# Patient Record
Sex: FEMALE | Race: WHITE | NOT HISPANIC OR LATINO | Employment: FULL TIME | URBAN - METROPOLITAN AREA
[De-identification: names, ages, dates, MRNs, and addresses within clinical notes are randomized per-mention and may not be internally consistent; named-entity substitution may affect disease eponyms.]

---

## 2024-07-10 PROCEDURE — 87660 TRICHOMONAS VAGIN DIR PROBE: CPT | Performed by: PHYSICIAN ASSISTANT

## 2024-07-10 PROCEDURE — 87480 CANDIDA DNA DIR PROBE: CPT | Performed by: PHYSICIAN ASSISTANT

## 2024-07-10 PROCEDURE — 87510 GARDNER VAG DNA DIR PROBE: CPT | Performed by: PHYSICIAN ASSISTANT

## 2024-07-16 ENCOUNTER — ROUTINE PRENATAL (OUTPATIENT)
Dept: OBGYN CLINIC | Facility: CLINIC | Age: 29
End: 2024-07-16

## 2024-07-16 VITALS — SYSTOLIC BLOOD PRESSURE: 114 MMHG | DIASTOLIC BLOOD PRESSURE: 62 MMHG | BODY MASS INDEX: 21.83 KG/M2 | WEIGHT: 147.8 LBS

## 2024-07-16 DIAGNOSIS — Z3A.18 18 WEEKS GESTATION OF PREGNANCY: ICD-10-CM

## 2024-07-16 DIAGNOSIS — Z34.02 ENCOUNTER FOR SUPERVISION OF NORMAL FIRST PREGNANCY, SECOND TRIMESTER: Primary | ICD-10-CM

## 2024-07-16 PROCEDURE — PNV

## 2024-07-16 NOTE — PROGRESS NOTES
Patient is a 27 YO  female presenting to the office at 18w1d for routine OB care.   Patient is feeling well today.   Recently dx with BV and Yeast infection, still taking Monistat and Flagyl, she has been feeling better in regards to vaginal sx. Patient does still c/o episodes of near syncope. She has been finding ways to manage this like adding a stool to her kitchen to sit while she cooks. She notes that she has stopped grocery shopping alone because stores seem to be a trigger for her symptoms.  US with M 24  Fetal heart rate: 135  BP: 114/62  TWG: 15lb 12.8oz  Fetal Movement: not feeling yet  LOF: no  VB: no  CTX: no  Reviewed precautions  Call for concerns  RTO 4 weeks

## 2024-07-30 ENCOUNTER — ROUTINE PRENATAL (OUTPATIENT)
Facility: HOSPITAL | Age: 29
End: 2024-07-30
Payer: COMMERCIAL

## 2024-07-30 VITALS
SYSTOLIC BLOOD PRESSURE: 104 MMHG | OXYGEN SATURATION: 99 % | HEART RATE: 94 BPM | HEIGHT: 69 IN | DIASTOLIC BLOOD PRESSURE: 54 MMHG | BODY MASS INDEX: 22.48 KG/M2 | WEIGHT: 151.8 LBS

## 2024-07-30 DIAGNOSIS — Z36.86 ENCOUNTER FOR ANTENATAL SCREENING FOR CERVICAL LENGTH: ICD-10-CM

## 2024-07-30 DIAGNOSIS — Z36.89 ENCOUNTER FOR FETAL ANATOMIC SURVEY: ICD-10-CM

## 2024-07-30 DIAGNOSIS — Z3A.20 20 WEEKS GESTATION OF PREGNANCY: Primary | ICD-10-CM

## 2024-07-30 PROBLEM — Z36.82 NUCHAL TRANSLUCENCY OF FETUS ON PRENATAL ULTRASOUND: Status: RESOLVED | Noted: 2024-06-05 | Resolved: 2024-07-30

## 2024-07-30 PROBLEM — Z13.79 GENETIC SCREENING: Status: RESOLVED | Noted: 2024-06-05 | Resolved: 2024-07-30

## 2024-07-30 PROCEDURE — 76817 TRANSVAGINAL US OBSTETRIC: CPT | Performed by: OBSTETRICS & GYNECOLOGY

## 2024-07-30 PROCEDURE — 99213 OFFICE O/P EST LOW 20 MIN: CPT | Performed by: OBSTETRICS & GYNECOLOGY

## 2024-07-30 PROCEDURE — 76805 OB US >/= 14 WKS SNGL FETUS: CPT | Performed by: OBSTETRICS & GYNECOLOGY

## 2024-07-30 NOTE — PROGRESS NOTES
"[facility]: Po Nieves was seen today at 20w1d for anatomic survey and cervical length screening ultrasound.  See ultrasound report under \"OB Procedures\" tab.  Please don't hesitate to contact our office with any concerns or questions.  -Paige Packer MD      "

## 2024-07-30 NOTE — PROGRESS NOTES
Ultrasound Probe Disinfection    A transvaginal ultrasound was performed.   Prior to use, disinfection was performed with High Level Disinfection Process (Pharmapodon).  Probe serial number [id number] [facility]: [id number] was used.      Claudette Anderson  07/30/24  8:01 AM

## 2024-07-30 NOTE — LETTER
"2024     Winsome Jain MD   [facility] [address]    Patient: Po Nieves   YOB: 1995   Date of Visit: 2024       Dear Dr. Jain:    Thank you for referring Po Nieves to me for evaluation. Below are my notes for this consultation.    If you have questions, please do not hesitate to call me. I look forward to following your patient along with you.         Sincerely,        Paige Packer MD        CC: No Recipients    Paige Packer MD  2024  9:40 AM  Sign when Signing Visit  [facility]: Po Nieves was seen today at 20w1d for anatomic survey and cervical length screening ultrasound.  See ultrasound report under \"OB Procedures\" tab.  Please don't hesitate to contact our office with any concerns or questions.  -Paige Packer MD         "

## 2024-08-15 ENCOUNTER — ROUTINE PRENATAL (OUTPATIENT)
Dept: OBGYN CLINIC | Facility: CLINIC | Age: 29
End: 2024-08-15

## 2024-08-15 VITALS — DIASTOLIC BLOOD PRESSURE: 60 MMHG | BODY MASS INDEX: 23.04 KG/M2 | SYSTOLIC BLOOD PRESSURE: 120 MMHG | WEIGHT: 156 LBS

## 2024-08-15 DIAGNOSIS — Z3A.22 22 WEEKS GESTATION OF PREGNANCY: ICD-10-CM

## 2024-08-15 DIAGNOSIS — Z34.82 ENCOUNTER FOR SUPERVISION OF OTHER NORMAL PREGNANCY, SECOND TRIMESTER: Primary | ICD-10-CM

## 2024-08-15 PROCEDURE — PNV: Performed by: OBSTETRICS & GYNECOLOGY

## 2024-08-15 NOTE — PROGRESS NOTES
28 y.o.   female at 22.3 wga for PNV. BP : 120/60. TW  + FM, - LOF, - Ctxn, - bleeding  Feeling well.  No complaints  Growth at 32 weeks  Reviewed PTL precautions  F/u 4 weeks

## 2024-09-10 ENCOUNTER — APPOINTMENT (OUTPATIENT)
Dept: LAB | Facility: AMBULARY SURGERY CENTER | Age: 29
End: 2024-09-10

## 2024-09-16 ENCOUNTER — ROUTINE PRENATAL (OUTPATIENT)
Dept: OBGYN CLINIC | Facility: CLINIC | Age: 29
End: 2024-09-16

## 2024-09-16 VITALS — SYSTOLIC BLOOD PRESSURE: 114 MMHG | BODY MASS INDEX: 23.83 KG/M2 | WEIGHT: 161.4 LBS | DIASTOLIC BLOOD PRESSURE: 60 MMHG

## 2024-09-16 DIAGNOSIS — Z3A.27 27 WEEKS GESTATION OF PREGNANCY: ICD-10-CM

## 2024-09-16 DIAGNOSIS — Z34.82 ENCOUNTER FOR SUPERVISION OF OTHER NORMAL PREGNANCY, SECOND TRIMESTER: Primary | ICD-10-CM

## 2024-09-16 PROCEDURE — PNV: Performed by: OBSTETRICS & GYNECOLOGY

## 2024-09-16 NOTE — PROGRESS NOTES
29 y.o.  female at 27w0d (Estimated Date of Delivery: 24) for PNV.    Pre- Vitals      Flowsheet Row Most Recent Value   Prenatal Assessment    Fetal Heart Rate 135   Fundal Height (cm) 27 cm   Movement Present   Prenatal Vitals    Blood Pressure 114/60   Weight - Scale 73.2 kg (161 lb 6.4 oz)   Urine Albumin/Glucose    Dilation/Effacement/Station    Vaginal Drainage    Edema           TW.3 kg (29 lb 6.4 oz)    Leakage of fluid: no  Vaginal bleeding: no  Contractions/Cramping: no  Fetal movement: yes    Unsure of contraception plan.   Planning to get a flu shot.   28w labs ordered   Pt verbalized concerned about her [facility] labs- elevated cholesterol.   Discussed healthy diet (which she is doing now) and exercise. Waiting to hear back from PCP. A1c WNL.   Normotensive today   Went over labor precautions and fetal kick counts     RTO in 2 weeks.

## 2024-09-16 NOTE — PROGRESS NOTES
Patient states she is feeling well, no complaints. 28w labs ordered.     Patient unable to leave urine sample.

## 2024-09-16 NOTE — PATIENT INSTRUCTIONS
The Third Trimester  (28-42 weeks)  YOUR BABY   * your baby sucks its thumb now!   * your baby can hear voices and respond to touch…..so talk to him or her!!   * your baby’s brain grows and develops most in the last 2 months of pregnancy   * baby’s head and bones are soft and flexible so they can fit through the birth canal   * baby’s movements change towards the end of pregnancy because there is less room for kicking and stretching in your belly   * baby’s lungs are not fully developed and completely ready to breathe on their own until the last 3-4 weeks before your due date    YOUR BODY   * your belly is growing a lot now   * it may become more difficult to sleep well at night or to be as active as you usually are   * you may sweat more than usual   * you will become more off-balance……be careful not to fall!!   * you may develop hemorrhoids (which can be painful and make it difficult to sit down)   * the last two months of pregnancy can become very uncomfortable……with backaches, headaches, and heartburn   * you can start to have contractions…….as long as they are irregular and less than 5 per hour, this is a normal part of your body getting ready to have a baby   * your cervix may start to thin out and open up……to get ready for delivery   * you may find yourself needing to “pee” very often…….because baby is pressing on your bladder so much   * you may get out of breathe more quickly than usual      FETAL KICK COUNTS    In the third trimester (after 28 weeks gestation) you should be performing fetal kick counts every day.  Your baby should move at least 10 times in 2 hours during an active time, once a day.    Choose atime of day when your baby is most active.  Try to do this around the same time each day.  Get into a comfortable position and then write down the time your baby first moves.  Count each movement until the baby moves 10 times.  These movements include kicks, punches, nudges, flutters, or rolls.  This  can take anywhere from 5 minutes to 2 hours.  Write down the time you feel the baby's 10th movement.    If 2 hours has passed and your baby has not moved at least 10 times, you should CALL THE OFFICE RIGHT AWAY.  [phone]          PREMATURE LABOR     When to call [phone]:  * I need to call immediately if I have even a small amount of LIQUID leaking from my vagina, with or without contractions.   * I need to call if I am BLEEDING from my vagina.   * I need to call if I am feeling CRAMPING that continues after drinking 2-3 glasses of water and lying down on my side for one hour and that feels like I am having a period.   * I need to call if I feel CONTRACTIONS  more than 4 times in an hour that feels like the baby is “balling up” even after I try drinking 2-3 glasses of water and lying down on my side for an hour.   * I need to call if I notice a change in my vaginal DISCHARGE.   * I need to call if I am feeling PELVIC PRESSURE  that feels like the baby is pushing down into my vagina and lasts more than an hour.   * I need to call if I have LOW BACKACHE which is new and near my tailbone.  It may either come and go several times during an hour or stay there constantly.          PRE-ECLAMPSIA     What is it?   Pre-eclampsia is a serious disease that can occur during pregnancy related to high blood pressures.  It can happen to any woman.     Why should I care?   Women who develop pre-eclampsia have serious risks which can include seizures, stroke, organ damage, premature birth of their baby.  In the very worst cases, it can cause death of the mother and/or their baby.     What should I pay attention to?   Signs and symptoms of pre-eclampsia can include:   * Severe swelling of face or hands    * A headache that will not go away even after you have taken Tylenol   * Seeing spots or changes in eyesight    * Pain in the upper abdomen or shoulder    * New nausea and vomiting (in the second half of pregnancy)    *  Sudden weight gain    * Difficulty breathing     What should I do?   If you experience any of the above symptoms of pre-eclampsia, contact your OB provider.  Finding pre-eclampsia early is important for you and your baby.  Call us at [phone]      BREASTFEEDING     BENEFITS FOR BABIES   * stronger immune systems (less allergies, eczema, asthma, and childhood cancers)   * less diarrhea and constipation or other GI diseases   * fewer colds and ear infections   * better vision and teeth (fewer cavities)   * improves IQ   * lower rates of diabetes and obesity in childhood     BENEFITS FOR MOMS   * promotes faster weight loss after delivery   * lower risk for postpartum depression   * lower risk for breast, uterine, and ovarian cancers   * lower risk for osteoporosis developing with age   * easier than formula - is always right with you, clean, and the right temperature   * less expensive than formula……it’s FREE !!!!     KEYS TO SUCCESSFUL BREASTFEEDING   * keep baby skin-to-skin until after first feeding event   * keep baby in your room with you during your hospital stay after delivery   * avoid any bottle feedings (unless medically necessary)   * limit the use of pacifiers and swaddling   * ask for help if you are having any issues……lactation consultants (who specialize in breastfeeding) are available to help you   * a healthy diet for mom……eating a variety of foods and portions in moderation    THINGS YOU SHOULD KNOW ABOUT BREASTFEEDING   * most medications are considered compatible with breastfeeding by the American Academy of Pediatrics, but you should check with your health care provider or lactation consultant prior to taking a new medication……just to be sure it is safe   * alcohol (beer, wine, liquor) can be passed from mother to baby through breast milk……an occasional, social drink is deemed acceptable by the American Academy of Pediatrics…..more than that should be avoided   * breastfeeding is NOT an  effective method of birth control   * nicotine (in cigarettes) can pass from mother to baby through breast milk…..however, for mothers who smoke, it is still healthier to breastfeed than use formula   * caffeine should be limited to 1-3 cups per day……includes coffee, soda, energy drinks         PERINEAL / VAGINAL MASSAGE    What can I do now to decrease my chances of tearing during delivery?  Massaging around the vaginal opening by you (or your partner), either antepartum (before birth) or during the second stage of labor, can reduce the likelihood of perineal tearing during childbirth.  Likewise, the use of warm packs held on the perineum during the pushing stage of labor can reduce the severity of your tear.  This will happen during the pushing stage of labor.  At home, you can also help reduce the chances of injury that may occur during the birth of your child through perineal massage.    When should I do this?  Starting around or shortly after 34 weeks of pregnancy, you or your partner should provide 5-10 minutes of vaginal massage 1-4 times per week.    How?  Use either almond, coconut, or olive oil and water mixture on 1 or 2 fingers (depending on comfort).  Insert finger(s) 3-5cm into the vagina.  Apply sweeping downward/sideward pressure from 3 to 9 o'clock for 5-10 minutes, 1-4 times per week.          WARNING SIGNS DURING PREGNANCY  Call our office at [phone] if you experience any of the followin. Vaginal bleeding  2. Sharp abdominal pain that does not go away  3. Fever (more than 100.4 and is not relieved by Tylenol)  4. Persistent vomiting lasting greater than 24 hours  5. Chest pain   6. Pain or burning when you urinate  7. Severe headache that doesn't resolve with Tylenol  8. Blurred vision or seeing spots in your vision  9. Sudden swelling of your face or hands  10. Redness, swelling or pain in a leg  11. A sudden weight gain in just a few days  12. Decrease in your baby's movement (after  28 weeks or the 6th month of pregnancy)  13. A loss of watery fluid from your vagina - can be a gush, a trickle or continuous wetness  14. After 20 weeks of pregnancy, rhythmic cramping (greater than 4 per hour) or menstrual like low/pelvic pain          VACCINES IN PREGNANCY    TDAP  Whooping cough (or pertussis) can be serious for anyone, but for your , it can be life-threatning.  Up to 20 babies die each year in the [place] Due to whooping cough.  About half of babies younger than 1 year old who get whooping cough need treatment in the hospital.  The younger the baby is when he or she gets whooping cough, the more likely he or she will need to be treated in a hospital.  When you receive the whooping cough vaccine (Tdap) during your pregnancy, your body will create protective antibodies and pass some of them to your baby before birth.  These antibodies can help protect your baby from getting whooping cough until they are old enough to be vaccinated themselves (usually around 6 months of age).    INFLUENZA  Changes in your immune, heart, and lung functions during pregnancy make you more likely to get seriously ill from the flu.  Catching the flu also increases your chances for serious problems for your developing baby, including premature labor and delivery.  It is recommended that all women who are pregnant during flu season should receive an influenza vaccine.

## 2024-09-18 ENCOUNTER — APPOINTMENT (OUTPATIENT)
Dept: LAB | Facility: AMBULARY SURGERY CENTER | Age: 29
End: 2024-09-18
Attending: OBSTETRICS & GYNECOLOGY
Payer: COMMERCIAL

## 2024-09-18 DIAGNOSIS — Z34.82 ENCOUNTER FOR SUPERVISION OF OTHER NORMAL PREGNANCY, SECOND TRIMESTER: ICD-10-CM

## 2024-09-18 DIAGNOSIS — Z3A.27 27 WEEKS GESTATION OF PREGNANCY: ICD-10-CM

## 2024-09-18 LAB
BASOPHILS # BLD AUTO: 0.04 THOUSANDS/ΜL (ref 0–0.1)
BASOPHILS NFR BLD AUTO: 0 % (ref 0–1)
EOSINOPHIL # BLD AUTO: 0.06 THOUSAND/ΜL (ref 0–0.61)
EOSINOPHIL NFR BLD AUTO: 1 % (ref 0–6)
ERYTHROCYTE [DISTWIDTH] IN BLOOD BY AUTOMATED COUNT: 13.2 % (ref 11.6–15.1)
GLUCOSE 1H P 50 G GLC PO SERPL-MCNC: 82 MG/DL (ref 70–134)
HCT VFR BLD AUTO: 34.5 % (ref 34.8–46.1)
HGB BLD-MCNC: 11.1 G/DL (ref 11.5–15.4)
IMM GRANULOCYTES # BLD AUTO: 0.17 THOUSAND/UL (ref 0–0.2)
IMM GRANULOCYTES NFR BLD AUTO: 2 % (ref 0–2)
LYMPHOCYTES # BLD AUTO: 1.27 THOUSANDS/ΜL (ref 0.6–4.47)
LYMPHOCYTES NFR BLD AUTO: 13 % (ref 14–44)
MCH RBC QN AUTO: 30 PG (ref 26.8–34.3)
MCHC RBC AUTO-ENTMCNC: 32.2 G/DL (ref 31.4–37.4)
MCV RBC AUTO: 93 FL (ref 82–98)
MONOCYTES # BLD AUTO: 0.73 THOUSAND/ΜL (ref 0.17–1.22)
MONOCYTES NFR BLD AUTO: 7 % (ref 4–12)
NEUTROPHILS # BLD AUTO: 7.8 THOUSANDS/ΜL (ref 1.85–7.62)
NEUTS SEG NFR BLD AUTO: 77 % (ref 43–75)
NRBC BLD AUTO-RTO: 0 /100 WBCS
PLATELET # BLD AUTO: 239 THOUSANDS/UL (ref 149–390)
PMV BLD AUTO: 10 FL (ref 8.9–12.7)
RBC # BLD AUTO: 3.7 MILLION/UL (ref 3.81–5.12)
TREPONEMA PALLIDUM IGG+IGM AB [PRESENCE] IN SERUM OR PLASMA BY IMMUNOASSAY: NORMAL
WBC # BLD AUTO: 10.07 THOUSAND/UL (ref 4.31–10.16)

## 2024-09-18 PROCEDURE — 86780 TREPONEMA PALLIDUM: CPT

## 2024-09-18 PROCEDURE — 85025 COMPLETE CBC W/AUTO DIFF WBC: CPT

## 2024-09-18 PROCEDURE — 36415 COLL VENOUS BLD VENIPUNCTURE: CPT

## 2024-09-18 PROCEDURE — 82950 GLUCOSE TEST: CPT

## 2024-09-23 ENCOUNTER — CLINICAL SUPPORT (OUTPATIENT)
Dept: POSTPARTUM | Facility: CLINIC | Age: 29
End: 2024-09-23

## 2024-09-23 DIAGNOSIS — Z32.2 ENCOUNTER FOR CHILDBIRTH INSTRUCTION: Primary | ICD-10-CM

## 2024-10-03 ENCOUNTER — ROUTINE PRENATAL (OUTPATIENT)
Dept: OBGYN CLINIC | Facility: CLINIC | Age: 29
End: 2024-10-03

## 2024-10-03 VITALS — SYSTOLIC BLOOD PRESSURE: 120 MMHG | BODY MASS INDEX: 24.96 KG/M2 | DIASTOLIC BLOOD PRESSURE: 70 MMHG | WEIGHT: 169 LBS

## 2024-10-03 DIAGNOSIS — Z34.82 ENCOUNTER FOR SUPERVISION OF OTHER NORMAL PREGNANCY, SECOND TRIMESTER: ICD-10-CM

## 2024-10-03 DIAGNOSIS — Z3A.29 29 WEEKS GESTATION OF PREGNANCY: Primary | ICD-10-CM

## 2024-10-03 PROCEDURE — PNV: Performed by: OBSTETRICS & GYNECOLOGY

## 2024-10-03 NOTE — PROGRESS NOTES
29 y.o.  female at 29w3d (Estimated Date of Delivery: 24) for PNV.    Pre- Vitals      Flowsheet Row Most Recent Value   Prenatal Assessment    Fetal Heart Rate 140   Movement Present   Prenatal Vitals    Blood Pressure 120/70   Weight - Scale 76.7 kg (169 lb)   Urine Albumin/Glucose    Dilation/Effacement/Station    Vaginal Drainage    Edema           TW.8 kg (37 lb)    28 wk labs reviewed.   Red folder given and reviewed. Discussed Fetal kick counts, PTL/Labor information, Baby & Me Classes.     Consent signed - ok with transfusion, full code.   Current visitor policy reviewed.   Patient plans to breastfeed.   Skin to skin, rooming in, delayed cord clamp, pain management in labor discussed.    TDAP: pt wishes to have it at the next visit.  Rhogam was not indicated.    Leakage of fluid: no  Vaginal bleeding: no  Contractions/Cramping: no  Fetal movement: yes      RTO in 2 weeks.

## 2024-10-09 ENCOUNTER — ROUTINE PRENATAL (OUTPATIENT)
Dept: OBGYN CLINIC | Facility: CLINIC | Age: 29
End: 2024-10-09
Payer: COMMERCIAL

## 2024-10-09 ENCOUNTER — NURSE TRIAGE (OUTPATIENT)
Age: 29
End: 2024-10-09

## 2024-10-09 VITALS — SYSTOLIC BLOOD PRESSURE: 120 MMHG | BODY MASS INDEX: 24.54 KG/M2 | WEIGHT: 166.2 LBS | DIASTOLIC BLOOD PRESSURE: 60 MMHG

## 2024-10-09 DIAGNOSIS — Z23 NEED FOR TDAP VACCINATION: ICD-10-CM

## 2024-10-09 DIAGNOSIS — Z23 NEED FOR INFLUENZA VACCINATION: ICD-10-CM

## 2024-10-09 DIAGNOSIS — Z34.03 ENCOUNTER FOR SUPERVISION OF NORMAL FIRST PREGNANCY IN THIRD TRIMESTER: Primary | ICD-10-CM

## 2024-10-09 PROCEDURE — 90472 IMMUNIZATION ADMIN EACH ADD: CPT | Performed by: OBSTETRICS & GYNECOLOGY

## 2024-10-09 PROCEDURE — PNV: Performed by: OBSTETRICS & GYNECOLOGY

## 2024-10-09 PROCEDURE — 90656 IIV3 VACC NO PRSV 0.5 ML IM: CPT | Performed by: OBSTETRICS & GYNECOLOGY

## 2024-10-09 PROCEDURE — 90471 IMMUNIZATION ADMIN: CPT | Performed by: OBSTETRICS & GYNECOLOGY

## 2024-10-09 PROCEDURE — 90715 TDAP VACCINE 7 YRS/> IM: CPT | Performed by: OBSTETRICS & GYNECOLOGY

## 2024-10-09 NOTE — TELEPHONE ENCOUNTER
"Pt is 30w2d calling with onset of Vulvular swelling last night. States area is tender due to swelling, but otherwise no pain, abnormal vaginal discharge, itching or irritation, vaginal bleeding, fever, odor, pelvic pain or injury to area. Denies having been bitten by bug to her knowledge or anything else that could have caused swelling. Denies OB concerns. RN advised patient should be seen for further evaluation by provider in the office. Offered appointment for this afternoon. Patient agreeable. No further questions.     Reason for Disposition   Pregnant with any other vulvar symptoms  (Exception: Feels like prior yeast infection, minor abrasion, mild rash < 24 hour duration, mild itching.)    Answer Assessment - Initial Assessment Questions  1. SYMPTOM: \"What's the main symptom you're concerned about?\" (e.g., rash, itching, swelling, dryness)      Swollen  2. LOCATION: \"Where is the  s/s located?\" (e.g., inside/outside, left/right)      External  3. ONSET: \"When did the  s/s  start?\"      Last night  4. PAIN: \"Is there any pain?\" If Yes, ask: \"How bad is it?\" (Scale: 1-10; mild, moderate, severe)      Tender due to swelling  5. CAUSE: \"What do you think is causing the symptoms?\"      Unsure  6. OTHER SYMPTOMS: \"Do you have any other symptoms?\" (e.g., fever, vaginal bleeding, pain with urination)      Denies  7. PRTAEEK: \"What date are you expecting to deliver?\"       12/16/24  8. PREGNANCY: \"How many weeks pregnant are you?\"      30w2d    Protocols used: Pregnancy - Vulvar Symptoms-Adult-    "

## 2024-10-10 ENCOUNTER — TELEPHONE (OUTPATIENT)
Dept: OBGYN CLINIC | Facility: CLINIC | Age: 29
End: 2024-10-10

## 2024-10-12 ENCOUNTER — CLINICAL SUPPORT (OUTPATIENT)
Dept: POSTPARTUM | Facility: CLINIC | Age: 29
End: 2024-10-12

## 2024-10-12 DIAGNOSIS — Z32.2 ENCOUNTER FOR CHILDBIRTH INSTRUCTION: Primary | ICD-10-CM

## 2024-10-15 ENCOUNTER — TELEPHONE (OUTPATIENT)
Dept: OBGYN CLINIC | Facility: CLINIC | Age: 29
End: 2024-10-15

## 2024-10-15 ENCOUNTER — ROUTINE PRENATAL (OUTPATIENT)
Dept: OBGYN CLINIC | Facility: CLINIC | Age: 29
End: 2024-10-15

## 2024-10-15 VITALS — SYSTOLIC BLOOD PRESSURE: 118 MMHG | WEIGHT: 169 LBS | DIASTOLIC BLOOD PRESSURE: 70 MMHG | BODY MASS INDEX: 24.96 KG/M2

## 2024-10-15 DIAGNOSIS — Z34.03 ENCOUNTER FOR SUPERVISION OF NORMAL FIRST PREGNANCY IN THIRD TRIMESTER: Primary | ICD-10-CM

## 2024-10-15 PROBLEM — Z34.93 ENCOUNTER FOR SUPERVISION OF NORMAL PREGNANCY IN THIRD TRIMESTER: Status: ACTIVE | Noted: 2024-08-15

## 2024-10-15 PROCEDURE — PNV: Performed by: OBSTETRICS & GYNECOLOGY

## 2024-10-15 NOTE — PROGRESS NOTES
"Pt states fetal movement is not as \"strong,\" however fetal kick counts are being met. Denies vb,lof,and ctx.   "

## 2024-10-15 NOTE — PROGRESS NOTES
29 y.o.  female at 31w1d (Estimated Date of Delivery: 24) for PNV.    Pre- Vitals      Flowsheet Row Most Recent Value   Prenatal Assessment    Fetal Heart Rate 135   Movement Present   Prenatal Vitals    Blood Pressure 118/70   Weight - Scale 76.7 kg (169 lb)   Urine Albumin/Glucose    Dilation/Effacement/Station    Vaginal Drainage    Edema           TW.8 kg (37 lb)    Leakage of fluid: no  Vaginal bleeding: no  Contractions/Cramping: no  Fetal movement: yes -- discussed her lower sensation of fetal kicks though still meeting fetal kick counts.    - reviewed FKC and discussed fetal movement  Birth plan reviewed  Patient expressed interest in 39 week IOL.    - family history of larger neonates at birth  RTO in 2 weeks.

## 2024-10-21 ENCOUNTER — CLINICAL SUPPORT (OUTPATIENT)
Dept: POSTPARTUM | Facility: CLINIC | Age: 29
End: 2024-10-21

## 2024-10-21 ENCOUNTER — ULTRASOUND (OUTPATIENT)
Facility: HOSPITAL | Age: 29
End: 2024-10-21
Payer: COMMERCIAL

## 2024-10-21 VITALS
DIASTOLIC BLOOD PRESSURE: 68 MMHG | BODY MASS INDEX: 25.11 KG/M2 | SYSTOLIC BLOOD PRESSURE: 122 MMHG | HEART RATE: 105 BPM | HEIGHT: 69 IN | WEIGHT: 169.53 LBS | OXYGEN SATURATION: 99 %

## 2024-10-21 DIAGNOSIS — Z3A.32 32 WEEKS GESTATION OF PREGNANCY: ICD-10-CM

## 2024-10-21 DIAGNOSIS — Z36.2 ENCOUNTER FOR OTHER ANTENATAL SCREENING FOLLOW-UP: Primary | ICD-10-CM

## 2024-10-21 DIAGNOSIS — Z32.2 ENCOUNTER FOR CHILDBIRTH INSTRUCTION: Primary | ICD-10-CM

## 2024-10-21 PROCEDURE — 99212 OFFICE O/P EST SF 10 MIN: CPT | Performed by: OBSTETRICS & GYNECOLOGY

## 2024-10-21 PROCEDURE — 76816 OB US FOLLOW-UP PER FETUS: CPT | Performed by: OBSTETRICS & GYNECOLOGY

## 2024-10-21 NOTE — PROGRESS NOTES
The patient was seen today for an ultrasound.  Please see ultrasound report (located under Ob Procedures) for additional details.   Thank you very much for allowing us to participate in the care of this very nice patient.  Should you have any questions, please do not hesitate to contact me.     Won Bower MD FACOG  Attending Physician, Maternal-Fetal Medicine  [facility]

## 2024-10-29 ENCOUNTER — ROUTINE PRENATAL (OUTPATIENT)
Dept: OBGYN CLINIC | Facility: CLINIC | Age: 29
End: 2024-10-29

## 2024-10-29 VITALS — WEIGHT: 170.8 LBS | SYSTOLIC BLOOD PRESSURE: 118 MMHG | BODY MASS INDEX: 25.22 KG/M2 | DIASTOLIC BLOOD PRESSURE: 60 MMHG

## 2024-10-29 DIAGNOSIS — Z34.83 ENCOUNTER FOR SUPERVISION OF OTHER NORMAL PREGNANCY IN THIRD TRIMESTER: Primary | ICD-10-CM

## 2024-10-29 DIAGNOSIS — Z3A.33 33 WEEKS GESTATION OF PREGNANCY: ICD-10-CM

## 2024-10-29 PROCEDURE — PNV

## 2024-10-29 NOTE — PROGRESS NOTES
Patient is a 28 YO  female presenting to the office at 33w1d for routine OB care.   Patient is feeling well today.   Fetal heart rate: 125  BP: 118/60  TWlb 12.8oz  Fetal Movement: yes, good movement   LOF: no  VB: no  CTX: no  Reviewed precautions  Call for concerns  RTO 2 weeks

## 2024-11-02 NOTE — PROGRESS NOTES
29 y.o.  female at 30w2d (Estimated Date of Delivery: 24) for PNV.    Pre- Vitals      Flowsheet Row Most Recent Value   Prenatal Assessment    Fetal Heart Rate 138   Movement Present   Prenatal Vitals    Blood Pressure 120/60   Weight - Scale 75.4 kg (166 lb 3.2 oz)   Urine Albumin/Glucose    Dilation/Effacement/Station    Vaginal Drainage    Edema           TWG: 15.5 kg (34 lb 3.2 oz)    Leakage of fluid: no  Vaginal bleeding: no  Contractions/Cramping: no  Fetal movement: yes    Patient concerned for vulvar swelling that is occurring. She reports some pelvic pressure. She denies vaginal bleeding and contractions.   Pelvic examination performed to evaluate vulvar swelling. No lesions, lacerations, and ulcerations visualized. No evidence of varicosities. No erythema, warmth, and drainage no concern for infection.   Reassurance provided to patient. Swelling likely secondary to fluid retention and mild third spacing in 3rd trimester.     Flu and TDAP vaccinations administered.

## 2024-11-11 ENCOUNTER — ROUTINE PRENATAL (OUTPATIENT)
Dept: OBGYN CLINIC | Facility: CLINIC | Age: 29
End: 2024-11-11
Payer: COMMERCIAL

## 2024-11-11 VITALS — SYSTOLIC BLOOD PRESSURE: 114 MMHG | DIASTOLIC BLOOD PRESSURE: 64 MMHG | BODY MASS INDEX: 25.67 KG/M2 | WEIGHT: 173.8 LBS

## 2024-11-11 DIAGNOSIS — Z34.03 ENCOUNTER FOR SUPERVISION OF NORMAL FIRST PREGNANCY IN THIRD TRIMESTER: ICD-10-CM

## 2024-11-11 DIAGNOSIS — Z23 NEED FOR VIRAL IMMUNIZATION: ICD-10-CM

## 2024-11-11 DIAGNOSIS — Z3A.35 35 WEEKS GESTATION OF PREGNANCY: Primary | ICD-10-CM

## 2024-11-11 PROCEDURE — 90471 IMMUNIZATION ADMIN: CPT | Performed by: OBSTETRICS & GYNECOLOGY

## 2024-11-11 PROCEDURE — PNV: Performed by: OBSTETRICS & GYNECOLOGY

## 2024-11-11 PROCEDURE — 90678 RSV VACC PREF BIVALENT IM: CPT | Performed by: OBSTETRICS & GYNECOLOGY

## 2024-11-11 NOTE — PROGRESS NOTES
29 y.o.  female at 35w0d (Estimated Date of Delivery: 24) for PNV.    Pre- Vitals      Flowsheet Row Most Recent Value   Prenatal Assessment    Fetal Heart Rate 130   Movement Present   Prenatal Vitals    Blood Pressure 114/64   Weight - Scale 78.8 kg (173 lb 12.8 oz)   Urine Albumin/Glucose    Dilation/Effacement/Station    Vaginal Drainage    Edema           TW kg (41 lb 12.8 oz)    Leakage of fluid: no  Vaginal bleeding: no  Contractions/Cramping: no  Fetal movement: yes    Will give RSV today   Desires 39w IOL     RTO in 1 weeks.

## 2024-11-19 ENCOUNTER — ROUTINE PRENATAL (OUTPATIENT)
Dept: OBGYN CLINIC | Facility: CLINIC | Age: 29
End: 2024-11-19

## 2024-11-19 VITALS — WEIGHT: 178 LBS | SYSTOLIC BLOOD PRESSURE: 122 MMHG | DIASTOLIC BLOOD PRESSURE: 60 MMHG | BODY MASS INDEX: 26.29 KG/M2

## 2024-11-19 DIAGNOSIS — Z3A.36 36 WEEKS GESTATION OF PREGNANCY: ICD-10-CM

## 2024-11-19 DIAGNOSIS — Z36.85 ANTENATAL SCREENING FOR STREPTOCOCCUS B: ICD-10-CM

## 2024-11-19 DIAGNOSIS — Z34.03 ENCOUNTER FOR SUPERVISION OF NORMAL FIRST PREGNANCY IN THIRD TRIMESTER: Primary | ICD-10-CM

## 2024-11-19 PROCEDURE — 87591 N.GONORRHOEAE DNA AMP PROB: CPT

## 2024-11-19 PROCEDURE — 87491 CHLMYD TRACH DNA AMP PROBE: CPT

## 2024-11-19 PROCEDURE — 87150 DNA/RNA AMPLIFIED PROBE: CPT

## 2024-11-19 PROCEDURE — PNV

## 2024-11-19 NOTE — PROGRESS NOTES
The patient is here for a 36 week prenatal visit. Gc/chlamydia and Gbs due.     The patient has a lot of cramping/ contractions/ ponce carballo.     No nausea, vomiting, headaches or dizziness.

## 2024-11-19 NOTE — PROGRESS NOTES
Patient is a 28 YO  female presenting to the office at 36 w1d for routine OB care.   Patient is feeling well today.   Fetal heart rate: 130  SVE: 0.5/0/-3  BP: 122/60  TWlb  GBS: collected  G/C: collected  Fetal Movement: yes, good movement   LOF: no  VB: no  CTX: yes, frequent CTX multiple times per day but irregular and inconsistent, she has been tracking CTX  Discussed delivery planning  Reviewed precautions  Call for concerns  RTO 1 week

## 2024-11-20 LAB
C TRACH DNA SPEC QL NAA+PROBE: NEGATIVE
N GONORRHOEA DNA SPEC QL NAA+PROBE: NEGATIVE

## 2024-11-21 ENCOUNTER — RESULTS FOLLOW-UP (OUTPATIENT)
Dept: OBGYN CLINIC | Facility: CLINIC | Age: 29
End: 2024-11-21

## 2024-11-21 ENCOUNTER — HOSPITAL ENCOUNTER (OUTPATIENT)
Facility: HOSPITAL | Age: 29
Discharge: HOME | End: 2024-11-21
Attending: OBSTETRICS & GYNECOLOGY | Admitting: OBSTETRICS & GYNECOLOGY
Payer: COMMERCIAL

## 2024-11-21 ENCOUNTER — NURSE TRIAGE (OUTPATIENT)
Age: 29
End: 2024-11-21

## 2024-11-21 VITALS — TEMPERATURE: 98.4 F | HEIGHT: 69 IN | WEIGHT: 187 LBS | RESPIRATION RATE: 18 BRPM | BODY MASS INDEX: 27.7 KG/M2

## 2024-11-21 PROBLEM — O36.8190 DECREASED FETAL MOVEMENT: Status: ACTIVE | Noted: 2024-11-21

## 2024-11-21 LAB — GP B STREP DNA SPEC QL NAA+PROBE: NEGATIVE

## 2024-11-21 PROCEDURE — 99213 OFFICE O/P EST LOW 20 MIN: CPT

## 2024-11-21 PROCEDURE — NC001 PR NO CHARGE: Performed by: OBSTETRICS & GYNECOLOGY

## 2024-11-21 PROCEDURE — 76815 OB US LIMITED FETUS(S): CPT

## 2024-11-21 NOTE — TELEPHONE ENCOUNTER
"Patient is 36w3d calling to report decreased fetal movement. Felt like baby was moving less this morning. Did a kick count for 1 hr - drinking cold water and ate candy/sugary snal. Felt 2 subtle movements in that hour. Notes baby's movement are usually much stronger. Denies vaginal bleeding, LOF, and contractions.     ESC to on call Dr. Calvo who agrees with L&D eval. Patient verbalizes understanding and agreement. ESC to unit charge RN making them aware of incoming patient.    Reason for Disposition   Pregnant 23 or more weeks and baby moving less today by kick count (e.g., kick count < 5 in 1 hour or < 10 in 2 hours)    Answer Assessment - Initial Assessment Questions  1. FETAL MOVEMENT: \"Has the baby's movement decreased or changed significantly from normal?\" (e.g., yes, no; describe) \"When was the last time you felt the baby move?\" (e.g., minutes, hours)      Decreased Fetal Movement - twice in one hour after cold water and candy/sugary snack and movements were more subtle  2. PRATEEK: \"What date are you expecting to deliver?\"       12/16/2024  3. PREGNANCY: \"How many weeks pregnant are you?\" \"How has the pregnancy been going?\"      36w3d  4. OTHER SYMPTOMS: \"Do you have any other symptoms?\" (e.g., abdomen pain, fever, leaking fluid from vagina, vaginal bleeding, widespread itching, etc.)      Denies vaginal bleeding, LOF, or contractions.    Protocols used: Pregnancy - Decreased or Abnormal Fetal Movement-Adult-[place]    "

## 2024-11-21 NOTE — PROGRESS NOTES
"L&D Triage Note - OB/GYN  Po Nieves 29 y.o. female MRN: [id number]  Unit/Bed#:  [facility]  Encounter: [id number]    ASSESSMENT  Po Nieves is a 29 y.o.  at 36w3d presenting with DFM. NST reactive and CLAYTON wnl. Parient endorses return of movement in triage. She is to be discharged home with return precautions.    PLAN  #1. DFM:   NST reactive  CLAYTON: 16.64 cm    Discharge instructions  Patient instructed to call if experiencing worsening contractions, vaginal bleeding, loss of fluid or decreased fetal movement.  Will follow up with OBGYN on 2024.    D/w Dr. Calvo  ______________    SUBJECTIVE    PRATEEK: Estimated Date of Delivery: 24    HPI:  29 y.o.  36w3d presents with complaint of decreased fetal movement since this morning. She endorses return of movement while in triage.     Contractions: denies  Leakage of fluid: denies  Vaginal Bleeding: denies  Fetal movement: present, decreased    Her obstetrical history is unremarkable.    ROS:  Constitutional: Negative  Respiratory: Negative  Cardiovascular: Negative    Gastrointestinal: Negative    OBJECTIVE:    Vitals:  Temp 98.4 °F (36.9 °C) (Oral)   Resp 18   Ht 5' 9\" (1.753 m)   Wt 84.8 kg (187 lb)   LMP 2024 (Exact Date)   BMI 27.62 kg/m²   Body mass index is 27.62 kg/m².    Physical Exam  Vitals and nursing note reviewed.   Constitutional:       General: She is not in acute distress.     Appearance: She is well-developed.   HENT:      Head: Normocephalic and atraumatic.   Eyes:      Conjunctiva/sclera: Conjunctivae normal.   Cardiovascular:      Rate and Rhythm: Normal rate and regular rhythm.      Heart sounds: No murmur heard.  Pulmonary:      Effort: Pulmonary effort is normal. No respiratory distress.      Breath sounds: Normal breath sounds.   Abdominal:      Palpations: Abdomen is soft.      Tenderness: There is no abdominal tenderness.   Musculoskeletal:         General: No swelling.      Cervical back: Neck " Patient notified.  Patient verbalized understanding of information given.     supple.   Skin:     General: Skin is warm and dry.      Capillary Refill: Capillary refill takes less than 2 seconds.   Neurological:      Mental Status: She is alert.   Psychiatric:         Mood and Affect: Mood normal.          FHT:  Baseline Rate (FHR): 115 bpm  Variability: Moderate  Accelerations: 15 x 15 or greater, At variable times  Decelerations: None  FHR Category: Category I    TOCO:   Contraction Frequency (minutes): 0    IMAGING        TAUS   CLAYTON      - Q1 5.07 cm     - Q2 2.74 cm     - Q3 3.20 cm     - Q4 5.63 cm     - Total: 16.46 cm    Labs: No results found for this or any previous visit (from the past 24 hours).        Megan Moise MD  11/21/2024  5:51 PM

## 2024-11-21 NOTE — PROCEDURES
Po Nieves, a  at 36w3d with an PRATEEK of 2024, by Ultrasound, was seen at [facility] for the following procedure(s): $Procedure Type: CLAYTON]            4 Quadrant CLAYTON  CLAYTON Q1 (cm): 5.1 cm  CLAYTON Q2 (cm): 2.7 cm  CLAYTON Q3 (cm): 3.2 cm  CLAYTON Q4 (cm): 5.6 cm  CLAYTON TOTAL (cm): 16.6 cm       Megan Moise  PGY-1 OB/GYN  24  5:53 PM

## 2024-11-26 ENCOUNTER — ROUTINE PRENATAL (OUTPATIENT)
Dept: OBGYN CLINIC | Facility: CLINIC | Age: 29
End: 2024-11-26

## 2024-11-26 VITALS — WEIGHT: 178.1 LBS | DIASTOLIC BLOOD PRESSURE: 70 MMHG | BODY MASS INDEX: 26.3 KG/M2 | SYSTOLIC BLOOD PRESSURE: 120 MMHG

## 2024-11-26 DIAGNOSIS — Z3A.37 37 WEEKS GESTATION OF PREGNANCY: ICD-10-CM

## 2024-11-26 DIAGNOSIS — Z34.83 ENCOUNTER FOR SUPERVISION OF OTHER NORMAL PREGNANCY IN THIRD TRIMESTER: Primary | ICD-10-CM

## 2024-11-26 PROCEDURE — PNV: Performed by: OBSTETRICS & GYNECOLOGY

## 2024-11-26 NOTE — PROGRESS NOTES
29 y.o.   female at 37.1 wga for PNV. BP : 120/70. TW  + FM, - LOF, - Ctxn, - bleeding  Feeling well, but ready to have her baby  Desires 39 week IOL  SVE //hi  GBS neg  Reviewed labor precautions and FKCs  F/u 1 week

## 2024-11-26 NOTE — PROGRESS NOTES
29 y.o.  female at 37w1d (Estimated Date of Delivery: 24) for PNV.      TW.9 kg (55 lb)    Leakage of fluid: no  Vaginal bleeding: no  Contractions/Cramping: yes  Fetal movement: yes    RTO in 1 weeks.      Undressed for a cervical check   Discuss IOL

## 2024-12-02 ENCOUNTER — NURSE TRIAGE (OUTPATIENT)
Age: 29
End: 2024-12-02

## 2024-12-02 NOTE — TELEPHONE ENCOUNTER
"38 weeks 0d , EDC 12/16/24. G1.  Having low back spasms occurring more frequently, had been timing earlier but then spaced out. Pain is in low back, not in front. Mild to moderate intensity comes and goes. Baby moving well, noticed increased vaginal discharge over last 2 days, no odor, no itching, no bleeding, no lof. Patient advised warm bath, gentle hip stretches, intermittent cold compresses, monitor pain and time if becoming more regular and/or more painful. Call back if LOF, bleeding, changes in fetal movements or any other concerning symptoms. Patient verbalzied understanding.  Next OB appointment 12/4   [facility] Dr. Monk. Agree with recommendations  SL        Reason for Disposition   Back pain    Answer Assessment - Initial Assessment Questions  1. ONSET: \"When did the pain begin?\"       On and off few days  2. LOCATION: \"Where does it hurt?\" (upper, mid or lower back)      Low back  3. SEVERITY: \"How bad is the pain?\"  (e.g., Scale 1-10; mild, moderate, or severe)      Mild to moderate comes and goes  4. PATTERN: \"Is the pain constant?\" (e.g., yes, no; constant, intermittent)       no  5. RADIATION: \"Does the pain shoot into your legs or somewhere else?\"      Low back  6. CAUSE:  \"What do you think is causing the back pain?\"       Baby postioning   7. BACK OVERUSE:  \"Any recent lifting of heavy objects, strenuous work or exercise?\"      denies  8. MEDICINES: \"What have you taken so far for the pain?\" (e.g., nothing, acetaminophen)      Denies any meds   9. NEUROLOGIC SYMPTOMS: \"Do you have any weakness, numbness, or problems with bowel/bladder control?\"      denies  10. OTHER SYMPTOMS: \"Do you have any other symptoms?\" (e.g., abdomen pain, blood in urine, fever, fluid leaking from vagina, pain with urination)        Some increase in normal vaginal discharge  11. PRATEEK: \"What date are you expecting to deliver?\"        12/16/24    Protocols used: Pregnancy - Back Pain-Adult-[place]    "

## 2024-12-04 ENCOUNTER — ROUTINE PRENATAL (OUTPATIENT)
Dept: OBGYN CLINIC | Facility: CLINIC | Age: 29
End: 2024-12-04

## 2024-12-04 VITALS — DIASTOLIC BLOOD PRESSURE: 70 MMHG | BODY MASS INDEX: 27.02 KG/M2 | SYSTOLIC BLOOD PRESSURE: 110 MMHG | WEIGHT: 183 LBS

## 2024-12-04 DIAGNOSIS — Z3A.37 37 WEEKS GESTATION OF PREGNANCY: Primary | ICD-10-CM

## 2024-12-04 PROCEDURE — PNV: Performed by: PHYSICIAN ASSISTANT

## 2024-12-04 NOTE — LETTER
Chase Fontenot!    We were able to get you scheduled on 12/9 at 3pm.  :-)  You can arrive at the [facility]nd [facility] at that time.  Best of luck!!    Bri

## 2024-12-04 NOTE — PROGRESS NOTES
The patient is here for a 38 week visit.     The patient has ponce carballo contractions.     The patient has had a mild headache for the past two days. No dizziness.     No nausea or vomiting.   The patient would like her cervix checked.     Urine dip- protein neg, glucose neg.

## 2024-12-09 ENCOUNTER — ANESTHESIA (INPATIENT)
Dept: ANESTHESIOLOGY | Facility: HOSPITAL | Age: 29
End: 2024-12-09
Payer: COMMERCIAL

## 2024-12-09 ENCOUNTER — ANESTHESIA EVENT (INPATIENT)
Dept: ANESTHESIOLOGY | Facility: HOSPITAL | Age: 29
End: 2024-12-09
Payer: COMMERCIAL

## 2024-12-09 ENCOUNTER — HOSPITAL ENCOUNTER (INPATIENT)
Facility: HOSPITAL | Age: 29
LOS: 3 days | Discharge: HOME | End: 2024-12-12
Attending: OBSTETRICS & GYNECOLOGY | Admitting: OBSTETRICS & GYNECOLOGY
Payer: COMMERCIAL

## 2024-12-09 ENCOUNTER — HOSPITAL ENCOUNTER (OUTPATIENT)
Dept: LABOR AND DELIVERY | Facility: HOSPITAL | Age: 29
Discharge: HOME | End: 2024-12-09
Payer: COMMERCIAL

## 2024-12-09 DIAGNOSIS — Z3A.39 39 WEEKS GESTATION OF PREGNANCY: ICD-10-CM

## 2024-12-09 LAB
ABO GROUP BLD: NORMAL
BLD GP AB SCN SERPL QL: NEGATIVE
ERYTHROCYTE [DISTWIDTH] IN BLOOD BY AUTOMATED COUNT: 14.6 % (ref 11.6–15.1)
HCT VFR BLD AUTO: 35 % (ref 34.8–46.1)
HGB BLD-MCNC: 11.1 G/DL (ref 11.5–15.4)
HOLD SPECIMEN: NORMAL
MCH RBC QN AUTO: 25.4 PG (ref 26.8–34.3)
MCHC RBC AUTO-ENTMCNC: 31.7 G/DL (ref 31.4–37.4)
MCV RBC AUTO: 80 FL (ref 82–98)
PLATELET # BLD AUTO: 269 THOUSANDS/UL (ref 149–390)
PMV BLD AUTO: 9.5 FL (ref 8.9–12.7)
RBC # BLD AUTO: 4.37 MILLION/UL (ref 3.81–5.12)
RH BLD: POSITIVE
SPECIMEN EXPIRATION DATE: NORMAL
TREPONEMA PALLIDUM IGG+IGM AB [PRESENCE] IN SERUM OR PLASMA BY IMMUNOASSAY: NORMAL
WBC # BLD AUTO: 11.77 THOUSAND/UL (ref 4.31–10.16)

## 2024-12-09 PROCEDURE — 85027 COMPLETE CBC AUTOMATED: CPT

## 2024-12-09 PROCEDURE — 86901 BLOOD TYPING SEROLOGIC RH(D): CPT

## 2024-12-09 PROCEDURE — 86900 BLOOD TYPING SEROLOGIC ABO: CPT

## 2024-12-09 PROCEDURE — 86780 TREPONEMA PALLIDUM: CPT

## 2024-12-09 PROCEDURE — NC001 PR NO CHARGE: Performed by: OBSTETRICS & GYNECOLOGY

## 2024-12-09 PROCEDURE — 86850 RBC ANTIBODY SCREEN: CPT

## 2024-12-09 RX ORDER — SODIUM CHLORIDE, SODIUM LACTATE, POTASSIUM CHLORIDE, CALCIUM CHLORIDE 600; 310; 30; 20 MG/100ML; MG/100ML; MG/100ML; MG/100ML
125 INJECTION, SOLUTION INTRAVENOUS CONTINUOUS
Status: DISCONTINUED | OUTPATIENT
Start: 2024-12-09 | End: 2024-12-10

## 2024-12-09 RX ORDER — ACETAMINOPHEN 325 MG/1
650 TABLET ORAL EVERY 4 HOURS PRN
Status: DISCONTINUED | OUTPATIENT
Start: 2024-12-09 | End: 2024-12-10

## 2024-12-09 RX ORDER — ONDANSETRON 2 MG/ML
4 INJECTION INTRAMUSCULAR; INTRAVENOUS EVERY 6 HOURS PRN
Status: DISCONTINUED | OUTPATIENT
Start: 2024-12-09 | End: 2024-12-10

## 2024-12-09 RX ORDER — BUPIVACAINE HYDROCHLORIDE 2.5 MG/ML
30 INJECTION, SOLUTION EPIDURAL; INFILTRATION; INTRACAUDAL ONCE AS NEEDED
Status: DISCONTINUED | OUTPATIENT
Start: 2024-12-09 | End: 2024-12-10

## 2024-12-09 RX ORDER — CALCIUM CARBONATE 500 MG/1
1000 TABLET, CHEWABLE ORAL 3 TIMES DAILY PRN
Status: DISCONTINUED | OUTPATIENT
Start: 2024-12-09 | End: 2024-12-10

## 2024-12-09 RX ORDER — OXYTOCIN/RINGER'S LACTATE 30/500 ML
1-30 PLASTIC BAG, INJECTION (ML) INTRAVENOUS
Status: DISCONTINUED | OUTPATIENT
Start: 2024-12-09 | End: 2024-12-10

## 2024-12-09 RX ADMIN — Medication 25 MCG: at 17:49

## 2024-12-09 RX ADMIN — SODIUM CHLORIDE, SODIUM LACTATE, POTASSIUM CHLORIDE, AND CALCIUM CHLORIDE 125 ML/HR: .6; .31; .03; .02 INJECTION, SOLUTION INTRAVENOUS at 16:03

## 2024-12-09 RX ADMIN — SODIUM CHLORIDE, SODIUM LACTATE, POTASSIUM CHLORIDE, AND CALCIUM CHLORIDE 125 ML/HR: .6; .31; .03; .02 INJECTION, SOLUTION INTRAVENOUS at 22:49

## 2024-12-09 RX ADMIN — OXYTOCIN 2 MILLI-UNITS/MIN: 10 INJECTION INTRAVENOUS at 22:39

## 2024-12-09 NOTE — OB LABOR/OXYTOCIN SAFETY PROGRESS
Labor Progress Note - Po Nieves 29 y.o. female MRN: [id number]    Unit/Bed#: -01 Encounter: [id number]       Contraction Frequency (minutes): 4-8     Uterine Activity Characteristics: Irritability  Cervical Dilation: 1        Cervical Effacement: 50  Fetal Station: -3  Baseline Rate (FHR): 125 bpm  Fetal Heart Rate (FHT): 170 BPM  FHR Category: I             Vital Signs:   Vitals:    12/09/24 1612   BP: 130/74   Pulse: 89   Resp: 16   Temp: 98.4 °F (36.9 °C)   SpO2: 98%     Notes/comments:   Pt resting comfortably. FHR Category I. [place] with contractions q5min. Singleton balloon in place. 25mcg vaginal Cytotec placed. Attending physician Dr. Yi aware.     James Cerna MD 12/9/2024 5:51 PM

## 2024-12-09 NOTE — PLAN OF CARE
Problem: PAIN - ADULT  Goal: Verbalizes/displays adequate comfort level or baseline comfort level  Description: Interventions:  - Encourage patient to monitor pain and request assistance  - Assess pain using appropriate pain scale  - Administer analgesics based on type and severity of pain and evaluate response  - Implement non-pharmacological measures as appropriate and evaluate response  - Consider cultural and social influences on pain and pain management  - Notify physician/advanced practitioner if interventions unsuccessful or patient reports new pain  Outcome: Progressing     Problem: Knowledge Deficit  Goal: Patient/family/caregiver demonstrates understanding of disease process, treatment plan, medications, and discharge instructions  Description: Complete learning assessment and assess knowledge base.  Interventions:  - Provide teaching at level of understanding  - Provide teaching via preferred learning methods  Outcome: Progressing     Problem: DISCHARGE PLANNING  Goal: Discharge to home or other facility with appropriate resources  Description: INTERVENTIONS:  - Identify barriers to discharge w/patient and caregiver  - Arrange for needed discharge resources and transportation as appropriate  - Identify discharge learning needs (meds, wound care, etc.)  - Arrange for interpretive services to assist at discharge as needed  - Refer to Case Management Department for coordinating discharge planning if the patient needs post-hospital services based on physician/advanced practitioner order or complex needs related to functional status, cognitive ability, or social support system  Outcome: Progressing     Problem: BIRTH - VAGINAL/ SECTION  Goal: Fetal and maternal status remain reassuring during the birth process  Description: INTERVENTIONS:  - Monitor vital signs  - Monitor fetal heart rate  - Monitor uterine activity  - Monitor labor progression (vaginal delivery)  - DVT prophylaxis  - Antibiotic  prophylaxis  Outcome: Progressing  Goal: Emotionally satisfying birthing experience for mother/fetus  Description: Interventions:  - Assess, plan, implement and evaluate the nursing care given to the patient in labor  - Advocate the philosophy that each childbirth experience is a unique experience and support the family's chosen level of involvement and control during the labor process   - Actively participate in both the patient's and family's teaching of the birth process  - Consider cultural, Congregation and age-specific factors and plan care for the patient in labor  Outcome: Progressing

## 2024-12-09 NOTE — H&P
H & P- Obstetrics   Po Nieves 29 y.o. female MRN: [id number]  Unit/Bed#: -01 Encounter: [id number]    Assessment: 29 y.o.  at 39w0d admitted for elective induction of labor.  SVE: /-3  FHT: 135 bpm  Clinical EFW: 2044g (64%) at 32w ; Cephalic confirmed by TAUS  GBS status: negative     Plan:   * 39 weeks gestation of pregnancy  Assessment & Plan  Admit   T&S, CBC, RPR  CLD  IV fluids  GBS prophylaxis is not needed  Induction with cytotec and Singleton balloon administration          Discussed case and plan w/ Dr. Quijano      Chief Complaint: induction    HPI: Po Nieves is a 29 y.o.  with an PRATEEK of 2024, by Ultrasound at 39w0d who is being admitted for induction of labor. She denies having uterine contractions, has no LOF, and reports no VB. She states she has felt good FM.    Patient Active Problem List   Diagnosis    39 weeks gestation of pregnancy    Encounter for supervision of normal pregnancy in third trimester    Decreased fetal movement       Baby complications/comments: none    Review of Systems   Constitutional:  Negative for chills and fever.   HENT:  Negative for congestion, sinus pressure and sinus pain.    Eyes:  Negative for visual disturbance.   Respiratory:  Negative for cough, shortness of breath and wheezing.    Cardiovascular:  Negative for chest pain, palpitations and leg swelling.   Gastrointestinal:  Negative for abdominal pain, constipation, diarrhea, nausea and vomiting.   Genitourinary:  Negative for dysuria, vaginal bleeding and vaginal discharge.   Skin:  Negative for color change, pallor and rash.   Neurological:  Negative for weakness and light-headedness.   Psychiatric/Behavioral:  Negative for agitation and behavioral problems.        OB Hx:  OB History    Para Term  AB Living   1 0 0 0 0 0   SAB IAB Ectopic Multiple Live Births   0 0 0 0 0      # Outcome Date GA Lbr Padilla/2nd Weight Sex Type Anes PTL Lv   1 Current                 Past Medical Hx:  Past Medical History:   Diagnosis Date    Anxiety     Never documented       Past Surgical hx:  No past surgical history on file.    Social Hx:  Alcohol use: denies  Tobacco use: denies  Other substance use: denies    No Known Allergies      Medications Prior to Admission:     Prenatal MV-Min-Fe Fum-FA-DHA (PRENATAL 1 PO)    Objective:  Temp:  [98.4 °F (36.9 °C)] 98.4 °F (36.9 °C)  HR:  [89] 89  BP: (130)/(74) 130/74  Resp:  [16] 16  SpO2:  [98 %] 98 %  There is no height or weight on file to calculate BMI.     Physical Exam:  Physical Exam  Constitutional:       General: She is not in acute distress.  Genitourinary:      Vulva normal.   HENT:      Head: Normocephalic.      Right Ear: External ear normal.      Left Ear: External ear normal.   Eyes:      General: No scleral icterus.        Right eye: No discharge.         Left eye: No discharge.      Conjunctiva/sclera: Conjunctivae normal.   Cardiovascular:      Rate and Rhythm: Normal rate and regular rhythm.      Pulses: Normal pulses.      Heart sounds: Normal heart sounds.   Pulmonary:      Effort: Pulmonary effort is normal. No respiratory distress.   Abdominal:      Palpations: Abdomen is soft.      Tenderness: There is no abdominal tenderness. There is no guarding.      Comments: Gravid uterus   Musculoskeletal:         General: No swelling or tenderness. Normal range of motion.      Cervical back: Normal range of motion.      Right lower leg: No edema.      Left lower leg: No edema.   Neurological:      Mental Status: She is alert and oriented to person, place, and time. Mental status is at baseline.   Skin:     General: Skin is warm and dry.      Capillary Refill: Capillary refill takes less than 2 seconds.   Psychiatric:         Behavior: Behavior normal.         Thought Content: Thought content normal.   Vitals and nursing note reviewed. Exam conducted with a chaperone present.          Lab Results   Component Value Date    WBC  11.77 (H) 12/09/2024    HGB 11.1 (L) 12/09/2024    HCT 35.0 12/09/2024     12/09/2024     Lab Results   Component Value Date    K 3.6 06/17/2024     06/17/2024    CO2 24 06/17/2024    BUN 6 06/17/2024    CREATININE 0.50 (L) 06/17/2024     Prenatal Labs: Reviewed      Blood type: B positive  Antibody: neg  GBS: neg  HIV: NR  Rubella: immune  Syphilis IgM/IgG: NR  HBsAg: NR  HCAb: NR  Chlamydia: neg  Gonorrhea: neg  Diabetes 1 hour screen: wnl  3 hour glucose: not indicated  Hgb: 11.1  Platelets: 239  >2 Midnights  INPATIENT     Signature/Title: Akash Burciaga MD  Date: 12/9/2024  Time: 4:51 PM

## 2024-12-09 NOTE — ASSESSMENT & PLAN NOTE
Admit   T&S, CBC, RPR  CLD  IV fluids  GBS prophylaxis is not needed  Induction with cytotec and Singleton balloon administration

## 2024-12-09 NOTE — OB LABOR/OXYTOCIN SAFETY PROGRESS
Labor Progress Note - Po Nieves 29 y.o. female MRN: [id number]    Unit/Bed#: -01 Encounter: [id number]       Contraction Frequency (minutes): 0     Uterine Activity Characteristics: Irritability  Cervical Dilation: 1        Cervical Effacement: 50  Fetal Station: -3  Baseline Rate (FHR): 130 bpm  Fetal Heart Rate (FHT): 130 BPM  FHR Category: 1               Vital Signs:   Vitals:    12/09/24 1612   BP: 130/74   Pulse: 89   Resp: 16   Temp: 98.4 °F (36.9 °C)   SpO2: 98%       Notes/comments:   Singleton balloon placed without difficulty.   Questionable deceleration when she was placed on the monitor after coming back from the bathroom. HR was low 100s, 90s. Maternal HR 80s. Return to baseline with excellent variability with accelerations. Will hold cyto for now, if remain cat 1 will place.       Hannah Yi MD 12/9/2024 4:51 PM

## 2024-12-10 LAB
BASE EXCESS BLDCOA CALC-SCNC: -5.3 MMOL/L (ref 3–11)
BASE EXCESS BLDCOV CALC-SCNC: -7.5 MMOL/L (ref 1–9)
HCO3 BLDCOA-SCNC: 22.5 MMOL/L (ref 17.3–27.3)
HCO3 BLDCOV-SCNC: 17.7 MMOL/L (ref 12.2–28.6)
O2 CT VFR BLDCOA CALC: 9.7 ML/DL
OXYHGB MFR BLDCOA: 39.7 %
OXYHGB MFR BLDCOV: 69.8 %
PCO2 BLDCOA: 51.3 MM[HG] (ref 30–60)
PCO2 BLDCOV: 35.5 MM HG (ref 27–43)
PH BLDCOA: 7.26 [PH] (ref 7.23–7.43)
PH BLDCOV: 7.32 [PH] (ref 7.19–7.49)
PO2 BLDCOA: 21.1 MM HG (ref 5–25)
PO2 BLDCOV: 33.2 MM HG (ref 15–45)
SAO2 % BLDCOV: 17 ML/DL

## 2024-12-10 PROCEDURE — 4A1HXCZ MONITORING OF PRODUCTS OF CONCEPTION, CARDIAC RATE, EXTERNAL APPROACH: ICD-10-PCS | Performed by: OBSTETRICS & GYNECOLOGY

## 2024-12-10 PROCEDURE — 3E033VJ INTRODUCTION OF OTHER HORMONE INTO PERIPHERAL VEIN, PERCUTANEOUS APPROACH: ICD-10-PCS | Performed by: OBSTETRICS & GYNECOLOGY

## 2024-12-10 PROCEDURE — 3E0P7VZ INTRODUCTION OF HORMONE INTO FEMALE REPRODUCTIVE, VIA NATURAL OR ARTIFICIAL OPENING: ICD-10-PCS | Performed by: OBSTETRICS & GYNECOLOGY

## 2024-12-10 PROCEDURE — 59400 OBSTETRICAL CARE: CPT | Performed by: OBSTETRICS & GYNECOLOGY

## 2024-12-10 PROCEDURE — 0KQM0ZZ REPAIR PERINEUM MUSCLE, OPEN APPROACH: ICD-10-PCS | Performed by: OBSTETRICS & GYNECOLOGY

## 2024-12-10 PROCEDURE — 0UQGXZZ REPAIR VAGINA, EXTERNAL APPROACH: ICD-10-PCS | Performed by: OBSTETRICS & GYNECOLOGY

## 2024-12-10 PROCEDURE — 82805 BLOOD GASES W/O2 SATURATION: CPT | Performed by: OBSTETRICS & GYNECOLOGY

## 2024-12-10 RX ORDER — IBUPROFEN 600 MG/1
600 TABLET, FILM COATED ORAL EVERY 6 HOURS
Status: DISCONTINUED | OUTPATIENT
Start: 2024-12-10 | End: 2024-12-12 | Stop reason: HOSPADM

## 2024-12-10 RX ORDER — CALCIUM CARBONATE 500 MG/1
1000 TABLET, CHEWABLE ORAL DAILY PRN
Status: DISCONTINUED | OUTPATIENT
Start: 2024-12-10 | End: 2024-12-12 | Stop reason: HOSPADM

## 2024-12-10 RX ORDER — SIMETHICONE 80 MG
80 TABLET,CHEWABLE ORAL 4 TIMES DAILY PRN
Status: DISCONTINUED | OUTPATIENT
Start: 2024-12-10 | End: 2024-12-12 | Stop reason: HOSPADM

## 2024-12-10 RX ORDER — BENZOCAINE/MENTHOL 6 MG-10 MG
1 LOZENGE MUCOUS MEMBRANE DAILY PRN
Status: DISCONTINUED | OUTPATIENT
Start: 2024-12-10 | End: 2024-12-12 | Stop reason: HOSPADM

## 2024-12-10 RX ORDER — ACETAMINOPHEN 325 MG/1
650 TABLET ORAL EVERY 6 HOURS
Status: DISCONTINUED | OUTPATIENT
Start: 2024-12-10 | End: 2024-12-12 | Stop reason: HOSPADM

## 2024-12-10 RX ORDER — LIDOCAINE HYDROCHLORIDE 10 MG/ML
INJECTION, SOLUTION EPIDURAL; INFILTRATION; INTRACAUDAL; PERINEURAL AS NEEDED
Status: DISCONTINUED | OUTPATIENT
Start: 2024-12-10 | End: 2024-12-11 | Stop reason: HOSPADM

## 2024-12-10 RX ORDER — TERBUTALINE SULFATE 1 MG/ML
0.25 INJECTION, SOLUTION SUBCUTANEOUS ONCE
Status: COMPLETED | OUTPATIENT
Start: 2024-12-10 | End: 2024-12-10

## 2024-12-10 RX ORDER — BUPIVACAINE HYDROCHLORIDE 2.5 MG/ML
INJECTION, SOLUTION EPIDURAL; INFILTRATION; INTRACAUDAL AS NEEDED
Status: DISCONTINUED | OUTPATIENT
Start: 2024-12-10 | End: 2024-12-11 | Stop reason: HOSPADM

## 2024-12-10 RX ORDER — ONDANSETRON 2 MG/ML
4 INJECTION INTRAMUSCULAR; INTRAVENOUS EVERY 8 HOURS PRN
Status: DISCONTINUED | OUTPATIENT
Start: 2024-12-10 | End: 2024-12-12 | Stop reason: HOSPADM

## 2024-12-10 RX ORDER — LIDOCAINE HYDROCHLORIDE AND EPINEPHRINE 15; 5 MG/ML; UG/ML
INJECTION, SOLUTION EPIDURAL
Status: COMPLETED | OUTPATIENT
Start: 2024-12-10 | End: 2024-12-10

## 2024-12-10 RX ORDER — OXYTOCIN/RINGER'S LACTATE 30/500 ML
250 PLASTIC BAG, INJECTION (ML) INTRAVENOUS ONCE
Status: DISCONTINUED | OUTPATIENT
Start: 2024-12-10 | End: 2024-12-12 | Stop reason: HOSPADM

## 2024-12-10 RX ORDER — TERBUTALINE SULFATE 1 MG/ML
INJECTION, SOLUTION SUBCUTANEOUS
Status: COMPLETED
Start: 2024-12-10 | End: 2024-12-10

## 2024-12-10 RX ORDER — CLONIDINE 100 UG/ML
INJECTION, SOLUTION EPIDURAL AS NEEDED
Status: DISCONTINUED | OUTPATIENT
Start: 2024-12-10 | End: 2024-12-11 | Stop reason: HOSPADM

## 2024-12-10 RX ADMIN — TERBUTALINE SULFATE 0.25 MG: 1 INJECTION SUBCUTANEOUS at 06:09

## 2024-12-10 RX ADMIN — LIDOCAINE HYDROCHLORIDE AND EPINEPHRINE 5 ML: 15; 5 INJECTION, SOLUTION EPIDURAL at 01:06

## 2024-12-10 RX ADMIN — ROPIVACAINE HYDROCHLORIDE: 2 INJECTION, SOLUTION EPIDURAL; INFILTRATION at 07:42

## 2024-12-10 RX ADMIN — SODIUM CHLORIDE, SODIUM LACTATE, POTASSIUM CHLORIDE, AND CALCIUM CHLORIDE 125 ML/HR: .6; .31; .03; .02 INJECTION, SOLUTION INTRAVENOUS at 12:00

## 2024-12-10 RX ADMIN — LIDOCAINE HYDROCHLORIDE AND EPINEPHRINE 5 ML: 15; 5 INJECTION, SOLUTION EPIDURAL at 09:02

## 2024-12-10 RX ADMIN — SODIUM CHLORIDE, SODIUM LACTATE, POTASSIUM CHLORIDE, AND CALCIUM CHLORIDE 125 ML/HR: .6; .31; .03; .02 INJECTION, SOLUTION INTRAVENOUS at 00:06

## 2024-12-10 RX ADMIN — SODIUM CHLORIDE, SODIUM LACTATE, POTASSIUM CHLORIDE, AND CALCIUM CHLORIDE 125 ML/HR: .6; .31; .03; .02 INJECTION, SOLUTION INTRAVENOUS at 06:55

## 2024-12-10 RX ADMIN — TERBUTALINE SULFATE 0.25 MG: 1 INJECTION, SOLUTION SUBCUTANEOUS at 06:09

## 2024-12-10 RX ADMIN — CLONIDINE HYDROCHLORIDE 100 MCG: 0.1 INJECTION, SOLUTION EPIDURAL at 05:12

## 2024-12-10 RX ADMIN — SODIUM CHLORIDE, SODIUM LACTATE, POTASSIUM CHLORIDE, AND CALCIUM CHLORIDE 999 ML/HR: .6; .31; .03; .02 INJECTION, SOLUTION INTRAVENOUS at 05:47

## 2024-12-10 RX ADMIN — BUPIVACAINE HYDROCHLORIDE 5 ML: 2.5 INJECTION, SOLUTION EPIDURAL; INFILTRATION; INTRACAUDAL at 05:12

## 2024-12-10 RX ADMIN — LIDOCAINE HYDROCHLORIDE 5 ML: 10 INJECTION, SOLUTION EPIDURAL; INFILTRATION; INTRACAUDAL at 08:10

## 2024-12-10 RX ADMIN — ROPIVACAINE HYDROCHLORIDE: 2 INJECTION, SOLUTION EPIDURAL; INFILTRATION at 01:00

## 2024-12-10 NOTE — DISCHARGE SUMMARY
Discharge Summary - OB/GYN  Po Nieves 29 y.o. female MRN: [id number]  Unit/Bed#: -01 Encounter: [id number]    Admission Date: 2024     Discharge Date: 24    Admitting Attending: Kd Urrutia Attending: Kathy    Discharging Attending: Susy    Principal Diagnosis: Pregnancy at 39w1d    Secondary Diagnosis:   1. Encounter for elective induction of labor     Procedures: spontaneous vaginal delivery    Anesthesia: epidural    Hospital course: Po Nieves is a 29 y.o.  at 39w0d who was initially admitted for [facility]. She was induced with muhammad balloon and cytotec, followed by pitocin titration.  She received her epidural for analgesia.  Rupture of membranes occurred spontaneously for clear fluid. She had an intermittently category II tracing. Pitocin was turned off at times for fetal intolerance. She received terbutaline. Pitocin was restarted. She had an intermittently cat II tracing that improved with rescucitative efforts and was in the setting of cervical change. She progressed to complete and started pushing.     She delivered a viable male  on 12/10/2024 at 1426. Weight 7lbs 10.6oz via normal spontaneous vaginal delivery. She sustained a second degree perineal laceration during delivery which was adequately repaired. Apgars were 9 (1 min) and 9 (5 min).  was transferred to  nursery. Patient tolerated the procedure well.     Her post-delivery course was complicated by gestational hypertension but was normotensive for her postpartum course. Her postpartum pain was well controlled with oral analgesics.    On day of discharge, she was ambulating and able to reasonably perform all ADLs. She was voiding and had appropriate bowel function. Pain was well controlled. She was discharged home on postpartum day #2 without complications. Patient was instructed to follow up with her OB as an outpatient and was given appropriate warnings to call provider if she  develops signs of infection or uncontrolled pain.    Complications: none apparent    Condition at discharge: good     Discharge instructions/Information to patient and family:   See after visit summary for information provided to patient and family.      Provisions for Follow-Up Care:  See after visit summary for information related to follow-up care and any pertinent home health orders.      Disposition: See After Visit Summary for discharge disposition information.    Planned Readmission: No    Discharge medications and instructions:   Please see AVS for full list of medications upon discharge.      Signature / Title:  Spring Villarreal MD, Resident - Ob/Gyn

## 2024-12-10 NOTE — OB LABOR/OXYTOCIN SAFETY PROGRESS
Labor Progress Note - Po Nieves 29 y.o. female MRN: [id number]    Unit/Bed#: -01 Encounter: [id number]    Dose (shalonda-units/min) Oxytocin: 2 shalonda-units/min  Contraction Frequency (minutes): n/a  Contraction Intensity: Mild/Moderate  Uterine Activity Characteristics: Irregular  Cervical Dilation: 5        Cervical Effacement: 70  Fetal Station: -2  Baseline Rate (FHR): 120 bpm  Fetal Heart Rate (FHT): 112 BPM  FHR Category: 2               Vital Signs:   Vitals:    12/09/24 2119   BP: 124/77   Pulse: 66   Resp: 18   Temp: 97.9 °F (36.6 °C)   SpO2:        Notes/comments:   Singleton dislodged. SVE as above. On monitoring fetal heart rate toggling 105-120s.   Good variability and accelerations. Will hold pitocin for right now. Fluid bolus running. Will plan for epidural and then amniotomy. Will internalize if needed after amniotomy. Continue to monitor closely.     Hannah Yi MD 12/9/2024 11:07 PM

## 2024-12-10 NOTE — ANESTHESIA PROCEDURE NOTES
Epidural Block    Start time: 12/10/2024 12:50 AM  Reason for block: procedure for pain  Staffing  Performed by: Jermain Lincoln MD  Authorized by: Jermain Lincoln MD    Preanesthetic Checklist  Completed: patient identified, IV checked, site marked, risks and benefits discussed, surgical consent, monitors and equipment checked, pre-op evaluation and timeout performed  Epidural  Patient position: sitting  Prep: ChloraPrep  Sedation Level: no sedation  Patient monitoring: frequent blood pressure checks, continuous pulse oximetry and heart rate  Approach: midline  Location: lumbar, L3-4  Injection technique: KARL air  Needle  Needle type: Tuohy   Needle gauge: 18 G  Catheter type: multi-orifice  Catheter size: 20 G  Catheter securement method: stabilization device and clear occlusive dressing  Test dose: negativelidocaine-epinephrine (XYLOCAINE-MPF/EPINEPHRINE) 1.5 %-1:200,000 injection 3 mL - Epidural   5 mL - 12/10/2024 1:06:00 AM  Assessment  Sensory level: [id number] aspiration for CSF, negative aspiration for heme and no paresthesia on injection  patient tolerated the procedure well with no immediate complications

## 2024-12-10 NOTE — OB LABOR/OXYTOCIN SAFETY PROGRESS
Labor Progress Note - Po Nieves 29 y.o. female MRN: [id number]    Unit/Bed#: -01 Encounter: [id number]       Contraction Frequency (minutes): n/a  Contraction Intensity: Mild/Moderate  Uterine Activity Characteristics: Irregular  Cervical Dilation: 2        Cervical Effacement: 50  Fetal Station: -3  Baseline Rate (FHR): 120 bpm  Fetal Heart Rate (FHT): 112 BPM  FHR Category: 1               Vital Signs:   Vitals:    12/09/24 2119   BP: 124/77   Pulse: 66   Resp: 18   Temp: 97.9 °F (36.6 °C)   SpO2:        Notes/comments:   Singleton still in place but close to expulsion.   Will start pitocin titration.       Hannah Yi MD 12/9/2024 10:14 PM

## 2024-12-10 NOTE — PLAN OF CARE
Problem: PAIN - ADULT  Goal: Verbalizes/displays adequate comfort level or baseline comfort level  Description: Interventions:  - Encourage patient to monitor pain and request assistance  - Assess pain using appropriate pain scale  - Administer analgesics based on type and severity of pain and evaluate response  - Implement non-pharmacological measures as appropriate and evaluate response  - Consider cultural and social influences on pain and pain management  - Notify physician/advanced practitioner if interventions unsuccessful or patient reports new pain  12/10/2024 1528 by Ngoc Sierra RN  Outcome: Progressing  12/10/2024 1052 by Ngoc Sierra RN  Outcome: Progressing     Problem: Knowledge Deficit  Goal: Patient/family/caregiver demonstrates understanding of disease process, treatment plan, medications, and discharge instructions  Description: Complete learning assessment and assess knowledge base.  Interventions:  - Provide teaching at level of understanding  - Provide teaching via preferred learning methods  12/10/2024 1528 by Ngoc Sierra RN  Outcome: Progressing  12/10/2024 1052 by Ngoc Sierra RN  Outcome: Progressing     Problem: DISCHARGE PLANNING  Goal: Discharge to home or other facility with appropriate resources  Description: INTERVENTIONS:  - Identify barriers to discharge w/patient and caregiver  - Arrange for needed discharge resources and transportation as appropriate  - Identify discharge learning needs (meds, wound care, etc.)  - Arrange for interpretive services to assist at discharge as needed  - Refer to Case Management Department for coordinating discharge planning if the patient needs post-hospital services based on physician/advanced practitioner order or complex needs related to functional status, cognitive ability, or social support system  12/10/2024 1528 by Ngoc Sierra RN  Outcome: Progressing  12/10/2024 1052 by Ngoc Sierra RN  Outcome:  Progressing     Problem: SAFETY ADULT  Goal: Patient will remain free of falls  Description: INTERVENTIONS:  - Educate patient/family on patient safety including physical limitations  - Instruct patient to call for assistance with activity   - Consult OT/PT to assist with strengthening/mobility   - Keep Call bell within reach  - Keep bed low and locked with side rails adjusted as appropriate  - Keep care items and personal belongings within reach  - Initiate and maintain comfort rounds  - Make Fall Risk Sign visible to staff  Outcome: Progressing  Goal: Maintain or return to baseline ADL function  Description: INTERVENTIONS:  -  Assess patient's ability to carry out ADLs; assess patient's baseline for ADL function and identify physical deficits which impact ability to perform ADLs (bathing, care of mouth/teeth, toileting, grooming, dressing, etc.)  - Assess/evaluate cause of self-care deficits   - Assess range of motion  - Assess patient's mobility; develop plan if impaired  - Assess patient's need for assistive devices and provide as appropriate  - Encourage maximum independence but intervene and supervise when necessary  - Involve family in performance of ADLs  - Assess for home care needs following discharge   - Consider OT consult to assist with ADL evaluation and planning for discharge  - Provide patient education as appropriate  Outcome: Progressing  Goal: Maintains/Returns to pre admission functional level  Description: INTERVENTIONS:  - Perform AM-PAC 6 Click Basic Mobility/ Daily Activity assessment daily.  - Set and communicate daily mobility goal to care team and patient/family/caregiver.   - Collaborate with rehabilitation services on mobility goals if consulted  -Outcome: Progressing     Problem: BIRTH - VAGINAL/ SECTION  Goal: Fetal and maternal status remain reassuring during the birth process  Description: INTERVENTIONS:  - Monitor vital signs  - Monitor fetal heart rate  - Monitor uterine  activity  - Monitor labor progression (vaginal delivery)  - DVT prophylaxis  - Antibiotic prophylaxis  12/10/2024 1528 by Ngoc Sierra RN  Outcome: Completed  12/10/2024 1052 by Ngoc Sierra RN  Outcome: Progressing  Goal: Emotionally satisfying birthing experience for mother/fetus  Description: Interventions:  - Assess, plan, implement and evaluate the nursing care given to the patient in labor  - Advocate the philosophy that each childbirth experience is a unique experience and support the family's chosen level of involvement and control during the labor process   - Actively participate in both the patient's and family's teaching of the birth process  - Consider cultural, Taoism and age-specific factors and plan care for the patient in labor  12/10/2024 1528 by Ngoc Sierra RN  Outcome: Completed  12/10/2024 1052 by Ngoc Sierra RN  Outcome: Progressing     Problem: POSTPARTUM  Goal: Experiences normal postpartum course  Description: INTERVENTIONS:  - Monitor maternal vital signs  - Assess uterine involution and lochia  Outcome: Progressing  Goal: Appropriate maternal -  bonding  Description: INTERVENTIONS:  - Identify family support  - Assess for appropriate maternal/infant bonding   -Encourage maternal/infant bonding opportunities  - Referral to  or  as needed  Outcome: Progressing  Goal: Establishment of infant feeding pattern  Description: INTERVENTIONS:  - Assess breast/bottle feeding  - Refer to lactation as needed  Outcome: Progressing  Goal: Incision(s), wounds(s) or drain site(s) healing without S/S of infection  Description: INTERVENTIONS  - Assess and document dressing, incision, wound bed, drain sites and surrounding tissue  - Provide patient and family education  -Outcome: Progressing

## 2024-12-10 NOTE — OB LABOR/OXYTOCIN SAFETY PROGRESS
Labor Progress Note - Po Nieves 29 y.o. female MRN: [id number]    Unit/Bed#: -01 Encounter: [id number]    Dose (shalonda-units/min) Oxytocin: 0 shalonda-units/min (Per dr Hannah Yi pause pitocin due to possible fetal heart rate decel)  Contraction Frequency (minutes): 4.5-5  Contraction Intensity: Moderate  Uterine Activity Characteristics: Irregular  Cervical Dilation: 5        Cervical Effacement: 70  Fetal Station: -2  Baseline Rate (FHR): 120 bpm  Fetal Heart Rate (FHT): 140 BPM  FHR Category: I             Vital Signs:   Vitals:    12/10/24 0330   BP: 118/67   Pulse:    Resp:    Temp:    SpO2:      Notes/comments:   Pt resting comfortably. FHR Category I. [place] with contractions q5-6min. SVE as above. Will restart pitocin. Will recheck in 2 hours or sooner if clinically indicated. Anticipate . Attending physician Dr. Yi aware.     James Cerna MD 12/10/2024 3:49 AM

## 2024-12-10 NOTE — PLAN OF CARE
Problem: PAIN - ADULT  Goal: Verbalizes/displays adequate comfort level or baseline comfort level  Description: Interventions:  - Encourage patient to monitor pain and request assistance  - Assess pain using appropriate pain scale  - Administer analgesics based on type and severity of pain and evaluate response  - Implement non-pharmacological measures as appropriate and evaluate response  - Consider cultural and social influences on pain and pain management  - Notify physician/advanced practitioner if interventions unsuccessful or patient reports new pain  Outcome: Progressing     Problem: Knowledge Deficit  Goal: Patient/family/caregiver demonstrates understanding of disease process, treatment plan, medications, and discharge instructions  Description: Complete learning assessment and assess knowledge base.  Interventions:  - Provide teaching at level of understanding  - Provide teaching via preferred learning methods  Outcome: Progressing     Problem: DISCHARGE PLANNING  Goal: Discharge to home or other facility with appropriate resources  Description: INTERVENTIONS:  - Identify barriers to discharge w/patient and caregiver  - Arrange for needed discharge resources and transportation as appropriate  - Identify discharge learning needs (meds, wound care, etc.)  - Arrange for interpretive services to assist at discharge as needed  - Refer to Case Management Department for coordinating discharge planning if the patient needs post-hospital services based on physician/advanced practitioner order or complex needs related to functional status, cognitive ability, or social support system  Outcome: Progressing     Problem: BIRTH - VAGINAL/ SECTION  Goal: Fetal and maternal status remain reassuring during the birth process  Description: INTERVENTIONS:  - Monitor vital signs  - Monitor fetal heart rate  - Monitor uterine activity  - Monitor labor progression (vaginal delivery)  - DVT prophylaxis  - Antibiotic  prophylaxis  Outcome: Progressing  Goal: Emotionally satisfying birthing experience for mother/fetus  Description: Interventions:  - Assess, plan, implement and evaluate the nursing care given to the patient in labor  - Advocate the philosophy that each childbirth experience is a unique experience and support the family's chosen level of involvement and control during the labor process   - Actively participate in both the patient's and family's teaching of the birth process  - Consider cultural, Anabaptism and age-specific factors and plan care for the patient in labor  Outcome: Progressing

## 2024-12-10 NOTE — OB LABOR/OXYTOCIN SAFETY PROGRESS
Labor Progress Note - Po Nieves 29 y.o. female MRN: [id number]    Unit/Bed#: -01 Encounter: [id number]    Dose (shalonda-units/min) Oxytocin: 2 shalonda-units/min  Contraction Frequency (minutes): 2-4  Contraction Intensity: Moderate/Strong  Uterine Activity Characteristics: Irregular  Cervical Dilation: 5        Cervical Effacement: 80  Fetal Station: -2  Baseline Rate (FHR): 115 bpm  Fetal Heart Rate (FHT): 118 BPM  FHR Category: II             Vital Signs:   Vitals:    12/10/24 0609   BP:    Pulse: 100   Resp:    Temp:    SpO2: 99%     Notes/comments:   Presented to bedside for prolonged deceleration, lasting total of 8 minutes. SVE unchanged at 5/80/-2. Sandy Yi and ZENIA Burciaga present. Patient repositioned to hands and knees. IUPC and FSE placed; recovery of FHR noted. Terbutaline administered after late deceleration on next contraction. Will continue to monitor.    James Cerna MD 12/10/2024 6:25 AM

## 2024-12-10 NOTE — ANESTHESIA PREPROCEDURE EVALUATION
Procedure:  LABOR ANALGESIA    Relevant Problems   GYN   (+) 39 weeks gestation of pregnancy   (+) Encounter for supervision of normal pregnancy in third trimester        Physical Exam    Airway    Mallampati score: II  TM Distance: >3 FB  Neck ROM: full     Dental   No notable dental hx     Cardiovascular      Pulmonary      Other Findings  post-pubertal.      Anesthesia Plan  ASA Score- 2     Anesthesia Type- epidural with ASA Monitors.         Additional Monitors:     Airway Plan:     Comment: Patient examined, history reviewed.  Labor epidural explained, risks and benefits discussed.  Consent has been signed..       Plan Factors-Exercise tolerance (METS): >4 METS.    Chart reviewed.   Existing labs reviewed. Patient summary reviewed.                  Induction-     Postoperative Plan-     Perioperative Resuscitation Plan - Level 1 - Full Code.       Informed Consent- Anesthetic plan and risks discussed with patient.  I personally reviewed this patient with the CRNA. Discussed and agreed on the Anesthesia Plan with the CRNA..

## 2024-12-10 NOTE — OB LABOR/OXYTOCIN SAFETY PROGRESS
Oxytocin Safety Progress Check Note - Po Nieves 29 y.o. female MRN: [id number]    Unit/Bed#: -01 Encounter: [id number]    Dose (shalonda-units/min) Oxytocin: 2 shalonda-units/min  Contraction Frequency (minutes): 2.5-4  Contraction Intensity: Moderate/Strong  Uterine Activity Characteristics: Irregular  Cervical Dilation: 5        Cervical Effacement: 80  Fetal Station: -2  Baseline Rate (FHR): 120 bpm  Fetal Heart Rate (FHT): 118 BPM  FHR Category: II             Vital Signs:   Vitals:    12/10/24 0427   BP: 130/82   Pulse: 85   Resp:    Temp:    SpO2:      Notes/comments:   Pt uncomfortable, anesthesia in room administering bolus. FHR Category II, intermittent late decels. [place] with contractions q3-6min. SVE as above. Pit running at 2, will continue to titrate to contractions. Will recheck in 2 hours or sooner if clinically indicated. Anticipate . Attending physician Dr. Yi aware.     James Cerna MD 12/10/2024 5:14 AM

## 2024-12-10 NOTE — OB LABOR/OXYTOCIN SAFETY PROGRESS
Labor Progress Note - Po Nieves 29 y.o. female MRN: [id number]    Unit/Bed#: -01 Encounter: [id number]    Dose (shalonda-units/min) Oxytocin: 0 shalonda-units/min (Per dr Hannah Yi pause pitocin due to possible fetal heart rate decel)  Contraction Frequency (minutes): 3-4  Contraction Intensity: Mild/Moderate  Uterine Activity Characteristics: Irregular  Cervical Dilation: 5        Cervical Effacement: 70  Fetal Station: -2  Baseline Rate (FHR): 110 bpm  Fetal Heart Rate (FHT): 135 BPM  FHR Category: I             Vital Signs:   Vitals:    12/10/24 0127   BP: 125/72   Pulse: 64   Resp:    Temp:    SpO2:      Notes/comments:   Pt resting comfortably. FHR Category I. [place] with contractions q2min. SVE as above. On questioning, patient believes SROM occurred at approx 0045. Pooling of clear fluid noted on exam by Dr. ZENIA Burciaga. Will recheck in 2 hours or sooner if clinically indicated. Anticipate . Attending physician Dr. Yi aware.    James Cerna MD 12/10/2024 1:44 AM

## 2024-12-10 NOTE — PROGRESS NOTES
29 y.o.  female at 39w1d (Estimated Date of Delivery: 24) for PNV.    Pre- Vitals      Flowsheet Row Most Recent Value   Prenatal Assessment    Movement Present   Prenatal Vitals    Blood Pressure 110/70   Weight - Scale 83 kg (183 lb)   Urine Albumin/Glucose    Dilation/Effacement/Station    Vaginal Drainage    Draining Fluid No   Edema    LLE Edema None   RLE Edema None   Facial Edema None          TW.1 kg (51 lb)    Leakage of fluid: no  Vaginal bleeding: no  Contractions/Cramping: yes--mild, irreg  Fetal movement: yes  Some pressure  Would like iol 24    RTO in 1 weeks.

## 2024-12-10 NOTE — OB LABOR/OXYTOCIN SAFETY PROGRESS
Labor Progress Note - Po Nieves 29 y.o. female MRN: [id number]    Unit/Bed#: -01 Encounter: [id number]    Dose (shalonda-units/min) Oxytocin: 0 shalonda-units/min (per Dr Chavez)  Contraction Frequency (minutes): 2-5.5  Contraction Intensity: Moderate  Uterine Activity Characteristics: Irregular  Cervical Dilation: 7        Cervical Effacement: 90  Fetal Station: -1  Baseline Rate (FHR): 130 bpm  Fetal Heart Rate (FHT): 132 BPM  FHR Category: II               Vital Signs:   Vitals:    12/10/24 1114   BP: 123/66   Pulse:    Resp:    Temp:    SpO2:        Notes/comments:   Patient with recurrent lates, then 7 minute deceleration. Pitocin was at 4, turned off during deceleration. Contractions were every 2-4 minutes. She did make cervical change, vertex now -1.   Will monitor for 30 min. If FHT remain reassuring, will restart pit at 2 and increase by 1 every 20 minutes. If continued lates/decelerations, will recommend . Moderate variability. Overall reassuring tracing at this time. All questions answered.         Yady Chavez,  12/10/2024 11:28 AM

## 2024-12-10 NOTE — ANESTHESIA POSTPROCEDURE EVALUATION
Post-Op Assessment Note          Post-op block assessment: catheter intact    No anethesia notable event occurred.            Last Filed PACU Vitals:  Vitals Value Taken Time   Temp     Pulse 100 12/10/24 1614   /70 12/10/24 1614   Resp 18    SpO2 98    Vitals shown include unfiled device data.    Modified Olu:  No data recorded    Epidural removed

## 2024-12-10 NOTE — OB LABOR/OXYTOCIN SAFETY PROGRESS
Oxytocin Safety Progress Check Note - Po Nieves 29 y.o. female MRN: [id number]    Unit/Bed#: -01 Encounter: [id number]    Dose (shalonda-units/min) Oxytocin: 1 shalonda-units/min (per Dr Chavez)  Contraction Frequency (minutes): 2-6  Contraction Intensity: Moderate  Uterine Activity Characteristics: Irregular  Cervical Dilation: Lip/rim (Comment)        Cervical Effacement: 100  Fetal Station: -1  Baseline Rate (FHR): 130 bpm  Fetal Heart Rate (FHT): 114 BPM  FHR Category: II               Vital Signs:   Vitals:    12/10/24 1159   BP: 116/57   Pulse: 92   Resp:    Temp:    SpO2:        Notes/comments:   Patient had another 6 minute deceleration. Pitocin turned off.   Moderate variability. Making henderson cervical change.   Will put pit on at 1mu/min, recheck in 30-60 minutes, sooner PRN.         Yady Chavez DO 12/10/2024 12:27 PM

## 2024-12-10 NOTE — ANESTHESIA PROCEDURE NOTES
Epidural Block    Patient location during procedure: OB/L&D  Start time: 12/10/2024 9:02 AM  Reason for block: procedure for pain  Staffing  Performed by: Bravo Velazquez MD  Authorized by: Bravo Velazquez MD    Preanesthetic Checklist  Completed: patient identified, IV checked, site marked, risks and benefits discussed, surgical consent, monitors and equipment checked, pre-op evaluation and timeout performed  Epidural  Patient position: sitting  Prep: ChloraPrep  Sedation Level: no sedation  Patient monitoring: frequent blood pressure checks, continuous pulse oximetry and heart rate  Approach: midline  Location: lumbar, L2-3  Injection technique: KARL saline  Needle  Needle type: Tuohy   Needle gauge: 18 G  Needle insertion depth: 5.5 cm  Catheter type: multi-orifice  Catheter size: 20 G  Catheter at skin depth: 11 cm  Catheter securement method: stabilization device, clear occlusive dressing and tape  Test dose: negativelidocaine-epinephrine (XYLOCAINE-MPF/EPINEPHRINE) 1.5 %-1:200,000 injection 3 mL - Epidural   5 mL - 12/10/2024 9:02:00 AM  Assessment  Sensory level: T10  Number of attempts: 1negative aspiration for CSF, negative aspiration for heme and no paresthesia on injection  patient tolerated the procedure well with no immediate complications

## 2024-12-10 NOTE — L&D DELIVERY NOTE
Vaginal Delivery Summary - OB/GYN   Po Nieves 29 y.o. female MRN: [id number]  Unit/Bed#: -01 Encounter: [id number]          Diagnosis:  Patient Active Problem List   Diagnosis     (spontaneous vaginal delivery)    Encounter for supervision of normal pregnancy in third trimester    Decreased fetal movement       Postdelivery Diagnosis:  1. Same as above  2. Delivery of term   3. Nuchal x 1  4. 2nd degree laceration    Procedure: normal spontaneous vaginal delivery    Attending: Yady Chavez DO    Resident: MAMTA Nagy    Anesthesia: Epidural    QBL: Non-Surgical QBL (mL): 103           Complications: None    Specimens: cord blood, arterial and venous cord blood gasses, placenta (storage)    Cord Gas:   Recent Results (from the past hour)   CORD, Blood gas, venous    Collection Time: 12/10/24  2:29 PM   Result Value Ref Range    pH, Cord Gilbert 7.315 7.190 - 7.490    pCO2, Cord Gilbert 35.5 27.0 - 43.0 mm HG    pO2, Cord Gilbert 33.2 15.0 - 45.0 mm HG    HCO3, Cord Gilbert 17.7 12.2 - 28.6 mmol/L    Base Exc, Cord Gilbert -7.5 (L) 1.0 - 9.0 mmol/L    O2 Cont, Cord Gilbert 17.0 mL/dL    O2 HGB,VENOUS CORD 69.8 %   CORD, Blood gas, arterial    Collection Time: 12/10/24  2:29 PM   Result Value Ref Range    pH, Cord Art 7.259 7.230 - 7.430    pCO2, Cord Art 51.3 30.0 - 60.0    pO2, Cord Art 21.1 5.0 - 25.0 mm HG    HCO3, Cord Art 22.5 17.3 - 27.3 mmol/L    Base Exc, Cord Art -5.3 (L) 3.0 - 11.0 mmol/L    O2 Content, Cord Art 9.7 ml/dl    O2 Hgb, Arterial Cord 39.7 %         Findings:   1. Viable male on 12/10/2024 at 2:26 PM  Baby's Weight:  ;    not available at time of dictation  Apgar scores: 9  and 9  at 1 and 5 minutes, respectively  2. Intact 3vc placenta delivered spontaneously at 1345  3. 2° degree laceration; Repaired:  yes: Suture: 2-0 Vicryl  4. B/l labial lacerations, right repaired with 4.0 vicryl on a SH    Disposition:  Patient tolerated the procedure well and was recovering in labor and delivery room      Brief history and labor course:  Po Nieves is a 29 y.o.  with an PRATEEK of 2024, by Ultrasound at 39w1d weeks gestation who was admitted for Induction of labor. A muhammad balloon was placed in the cervix and cytotec was placed in the vagina. After the muhammad came out, she was started on pitocin. She membranes ruptured spontaneously at 0045 for clear fluid.  Fetal tracing had intermittent lates and prolonged decelerations, and pit was never more than 4. It was shut off and restarted several times. She then began to progress and was complete at 1331. She started to push at 1345.    Warm compresses were applied and perineal massage performed during the pushing phase.       Description of procedure    After pushing for 41 minutes, the patient delivered a viable male  at 2:26 PM. The fetal vertex delivered spontaneously. There was evidence for nuchal cord that was loose and was easily reduced. The anterior shoulder delivered atraumatically with maternal expulsive forces and the assistance of gentle downward traction. The posterior shoulder delivered with maternal expulsive forces and the assistance of gentle upward traction. The remainder of the fetus delivered spontaneously.     Upon delivery, the infant was placed on the mothers abdomen and the cord was clamped and cut after a 30-60 second delay. Awaiting nurse resuscitators evaluated the . Arterial and venous cord blood gases and cord blood was collected for analysis. These were promptly sent to the lab. In the immediate post-partum, 30 units of IV pitocin was administered, and the uterus was noted to contract down well with massage and pitocin. The placenta delivered spontaneously at 1430 and was noted to have a centrally inserted 3 vessel cord. The uterus was massaged until firm and the lower uterine segment was cleared of all clot and debris.     The vagina, cervix, perineum, and rectum were inspected and there was noted to be a 2nd  degree laceration which was repaired with 2-0 vicryl. Under adequate anesthesia, the apex the vaginal laceration was identified and an anchoring suture was placed 1 cm above the apex.  The vaginal mucosa and underlying rectovaginal fascia were closed using a running locked 2.0 Vicryl-CT 1 suture to the hymenal ring.  The suture was then brought underneath the hymenal ring.  A stitch was then placed through the bulbocavernosus muscle and tied. Continuing with the same suture, the transverse perineal muscles were reapproximated with 2 transverse running sutures.  The suture was brought to the posterior apex of the skin laceration and then the skin was reapproximated in a subcuticular fashion to the hymenal ring. Excellent hemostasis was achieved.     The right labial laceration was wide, therefore it was re approximated with 1 interrupted stitch using 4/0 vicryl on an SH.     At the conclusion of the procedure, all needle, sponge, and instrument counts were noted to be correct.  Patient tolerated the procedure well. I was present and participated in all key portions of the case.    Yady Chavez DO, FACOG  [facility]  [phone]

## 2024-12-10 NOTE — OB LABOR/OXYTOCIN SAFETY PROGRESS
Labor Progress Note - Po Nieves 29 y.o. female MRN: [id number]    Unit/Bed#: -01 Encounter: [id number]    Dose (shalonda-units/min) Oxytocin: 0 shalonda-units/min  Contraction Frequency (minutes): 2.5-5  Contraction Intensity: Moderate  Uterine Activity Characteristics: Irregular  Cervical Dilation: 5        Cervical Effacement: 80  Fetal Station: -2  Baseline Rate (FHR): 120 bpm  Fetal Heart Rate (FHT): 124 BPM  FHR Category: II           Vital Signs:   Vitals:    12/10/24 0650   BP: 137/71   Pulse: 100   Resp:    Temp:    SpO2:        Notes/comments:   Tracing reviewed for past few  hours.   Moderate variability with periods of decreased variability.   Occasional late deceleration.   Overall tracing is reassuring.   She is very uncomfortable. Will have anesthesia team reassess.   Once comfortable, will likely restart pit with frequent position changes to try and get fetus to descend.   All questions answered.     Yady Chavez DO 12/10/2024 7:56 AM

## 2024-12-11 PROCEDURE — 99024 POSTOP FOLLOW-UP VISIT: CPT | Performed by: OBSTETRICS & GYNECOLOGY

## 2024-12-11 RX ADMIN — ACETAMINOPHEN 650 MG: 325 TABLET, FILM COATED ORAL at 04:22

## 2024-12-11 NOTE — LACTATION NOTE
This note was copied from a baby's chart.  CONSULT - LACTATION  Baby Boy Nieves (Alexis) 1 days male MRN: [id number]    [facility] NURSERY Room / Bed: (N)/(N) Encounter: [id number]    Maternal Information     MOTHER:  Po Nieves  Maternal Age: 29 y.o.  OB History: # 1 - Date: 12/10/24, Sex: Male, Weight: 3475 g (7 lb 10.6 oz), GA: 39w1d, Type: Vaginal, Spontaneous, Apgar1: 9, Apgar5: 9, Living: Living, Birth Comments: None   Previouse breast reduction surgery? No    Lactation history:   Has patient previously breast fed: No   How long had patient previously breast fed:     Previous breast feeding complications:     History reviewed. No pertinent surgical history.    Birth information:  YOB: 2023   Time of birth: 2:26 PM   Sex: male   Delivery type: Vaginal, Spontaneous   Birth Weight: 3475 g (7 lb 10.6 oz)   Percent of Weight Change: -1%     Gestational Age: 39w1d   [unfilled]    Assessment     Breast and nipple assessment: normal assessment     Assessment: sleepy    Feeding assessment: feeding well with guidance     LATCH:  Latch: Grasps breast, tongue down, lips flanged, rhythmic sucking (on second breast)   Audible Swallowing: A few with stimulation   Type of Nipple: Everted (After stimulation)   Comfort (Breast/Nipple): Soft/non-tender   Hold (Positioning): Partial assist, teach one side, mother does other, staff holds   LATCH Score: 8          Feeding recommendations:  breast feed on demand        In to see NEW Family. Mom concerned because baby ate well after delivery then has been sleepy today. Also had circumcision today. Baby waken.  Education on positioning and alignment. Mom is encouraged to:     - Bring baby up to the breast (use of pillows to elevate so baby's torso is against mom's breasts)   - Skin to skin for feedings with top hand exposed to show signs of satiation   - Chin deep into breast tissue (make baby look up to the  nipple)   - nose aligned to the nipple   -Wait for wide gape, drag chin on the breast so nipple is aimed at the upper, back palate  - Cheek should be touching breast   - Deep, firm hold of baby with ear, shoulder, hip alignment    Mom chose left breast to start. Using football hold to teach deep latch. Hand expression for large drops prior to each latch attempt. Baby remains sleepy with short latches with pistoon sucks. Burped. To right breast also using football hold. Worked on positioning infant up at chest level and starting to feed infant with nose arriving at the nipple. Then, using areolar compression to achieve a deep latch that is comfortable and exchanges optimum amounts of milk. Quickly guided to deep latch converting to rocker suckling. Taught to use breast compressions & stimulation to keep rocker suckling till asleep at breast, pops off OR no longer rocker suckling. Nursing parent  was able to note signs of a good feeding like: less discomfort after latch on tenderness, good rocker suckling with stimulation, breast softening; rounded nipple and relaxed tone after nursing at breast.  Dad also shown signs of good latch /feed.      12/11/24 1515   Breasts/Nipples   Left Breast Soft   Right Breast Soft   Left Nipple Everted   Right Nipple Everted   Intervention Other (comment);Hand expression  (Overview Good latch/feed & support available)   Breastfeeding Progress Not yet established;Breastfeeding well   Reasons for not Breastfeeding   (sleepy; dyad struggling with latch)   Breast Pump   Pump 3  (has breast pump from Insurance)   Patient Follow-Up   Lactation Consult Status 2   Follow-Up Type Inpatient;Call as needed   Other OB Lactation Documentation    Additional Problem Noted helped with waking; positioning & maintaining deep latch  (Ready, Set Baby Booklet @ bedside)     Encoraged nursing parent to call for assistance, questions and concerns.  Extension number for inpatient lactation support provided.          Claudette Alonso RN 12/11/2024 4:22 PM

## 2024-12-11 NOTE — PLAN OF CARE
Problem: PAIN - ADULT  Goal: Verbalizes/displays adequate comfort level or baseline comfort level  Description: Interventions:  - Encourage patient to monitor pain and request assistance  - Assess pain using appropriate pain scale  - Administer analgesics based on type and severity of pain and evaluate response  - Implement non-pharmacological measures as appropriate and evaluate response  - Consider cultural and social influences on pain and pain management  - Notify physician/advanced practitioner if interventions unsuccessful or patient reports new pain  Outcome: Progressing     Problem: Knowledge Deficit  Goal: Patient/family/caregiver demonstrates understanding of disease process, treatment plan, medications, and discharge instructions  Description: Complete learning assessment and assess knowledge base.  Interventions:  - Provide teaching at level of understanding  - Provide teaching via preferred learning methods  Outcome: Progressing     Problem: DISCHARGE PLANNING  Goal: Discharge to home or other facility with appropriate resources  Description: INTERVENTIONS:  - Identify barriers to discharge w/patient and caregiver  - Arrange for needed discharge resources and transportation as appropriate  - Identify discharge learning needs (meds, wound care, etc.)  - Arrange for interpretive services to assist at discharge as needed  - Refer to Case Management Department for coordinating discharge planning if the patient needs post-hospital services based on physician/advanced practitioner order or complex needs related to functional status, cognitive ability, or social support system  Outcome: Progressing     Problem: SAFETY ADULT  Goal: Patient will remain free of falls  Description: INTERVENTIONS:  - Educate patient/family on patient safety including physical limitations  - Instruct patient to call for assistance with activity   - Consult OT/PT to assist with strengthening/mobility   - Keep Call bell within  reach  - Keep bed low and locked with side rails adjusted as appropriate  - Keep care items and personal belongings within reach  - Initiate and maintain comfort rounds    Outcome: Progressing  Goal: Maintain or return to baseline ADL function  Description: INTERVENTIONS:  -  Assess patient's ability to carry out ADLs; assess patient's baseline for ADL function and identify physical deficits which impact ability to perform ADLs (bathing, care of mouth/teeth, toileting, grooming, dressing, etc.)  - Assess/evaluate cause of self-care deficits   - Assess range of motion  - Assess patient's mobility; develop plan if impaired  - Assess patient's need for assistive devices and provide as appropriate  - Encourage maximum independence but intervene and supervise when necessary  - Involve family in performance of ADLs  - Assess for home care needs following discharge   - Consider OT consult to assist with ADL evaluation and planning for discharge  - Provide patient education as appropriate  Outcome: Progressing  Goal: Maintains/Returns to pre admission functional level  Description: INTERVENTIONS:  - Perform AM-PAC 6 Click Basic Mobility/ Daily Activity assessment daily.  - Set and communicate daily mobility goal to care team and patient/family/caregiver.     - Out of bed for toileting  - Record patient progress and toleration of activity level   Outcome: Progressing     Problem: POSTPARTUM  Goal: Experiences normal postpartum course  Description: INTERVENTIONS:  - Monitor maternal vital signs  - Assess uterine involution and lochia  Outcome: Progressing  Goal: Appropriate maternal -  bonding  Description: INTERVENTIONS:  - Identify family support  - Assess for appropriate maternal/infant bonding   -Encourage maternal/infant bonding opportunities  - Referral to  or  as needed  Outcome: Progressing  Goal: Establishment of infant feeding pattern  Description: INTERVENTIONS:  - Assess  breast/bottle feeding  - Refer to lactation as needed  Outcome: Progressing  Goal: Incision(s), wounds(s) or drain site(s) healing without S/S of infection  Description: INTERVENTIONS  - Assess and document dressing, incision, wound bed, drain sites and surrounding tissue  - Provide patient and family education  - Perform skin care/dressing changes every day  Outcome: Progressing

## 2024-12-11 NOTE — PROGRESS NOTES
"Progress Note - OB/GYN  Po Nieves 29 y.o. female MRN: [id number]  Unit/Bed#: -01 Encounter: [id number]    Assessment and Plan     Po Nieves is a patient of: [facility]. She is PPD# 1 s/p  spontaneous vaginal delivery  Recovering well and is stable       *  (spontaneous vaginal delivery)  Assessment & Plan  Admit   T&S, CBC, RPR  CLD  IV fluids  GBS prophylaxis is not needed  Induction with cytotec and [place] balloon administration        Disposition    - Anticipate discharge home on PPD# 1      Subjective/Objective     Chief Complaint: Postpartum State     Subjective:    Po Nieves is PPD/POD#1 s/p  spontaneous vaginal delivery. She has no current complaints.  Pain is well controlled.  Patient is currently voiding.  She is ambulating.  Patient is currently passing flatus and has had bowel movement. She is tolerating PO, and denies nausea or vomitting. Patient denies fever, chills, chest pain, shortness of breath, or calf tenderness. Lochia is minimal. She is  Breastfeeding. She is recovering well and is stable.       Vitals:   /64 (BP Location: Left arm)   Pulse 83   Temp 98.5 °F (36.9 °C) (Oral)   Resp 18   Ht 5' 9\" (1.753 m)   Wt 83 kg (183 lb)   LMP 2024 (Exact Date)   SpO2 96%   Breastfeeding Yes   BMI 27.02 kg/m²       Intake/Output Summary (Last 24 hours) at 2024 0600  Last data filed at 12/10/2024 2124  Gross per 24 hour   Intake 1000 ml   Output 2528 ml   Net -1528 ml       Invasive Devices       Peripheral Intravenous Line  Duration             Peripheral IV 24 Left;Ventral (anterior) Forearm 1 day                    Physical Exam:   GEN: Po Nieves appears well, alert and oriented x 3, pleasant and cooperative   CARDIO: RRR, no murmurs or rubs  RESP:  CTAB, no wheezes or rales  ABDOMEN: soft, no tenderness, no distention, fundus below umbilicus  EXTREMITIES: SCDs on, non tender, no erythema, b/l Coral's sign negative      Labs: "     Hemoglobin   Date Value Ref Range Status   12/09/2024 11.1 (L) 11.5 - 15.4 g/dL Final   09/18/2024 11.1 (L) 11.5 - 15.4 g/dL Final     WBC   Date Value Ref Range Status   12/09/2024 11.77 (H) 4.31 - 10.16 Thousand/uL Final   09/18/2024 10.07 4.31 - 10.16 Thousand/uL Final     Platelets   Date Value Ref Range Status   12/09/2024 269 149 - 390 Thousands/uL Final   09/18/2024 239 149 - 390 Thousands/uL Final     Creatinine   Date Value Ref Range Status   06/17/2024 0.50 (L) 0.60 - 1.30 mg/dL Final     Comment:     Standardized to IDMS reference method          Heather Dudley MD  12/11/2024  6:00 AM

## 2024-12-11 NOTE — PLAN OF CARE
Problem: PAIN - ADULT  Goal: Verbalizes/displays adequate comfort level or baseline comfort level  Description: Interventions:  - Encourage patient to monitor pain and request assistance  - Assess pain using appropriate pain scale  - Administer analgesics based on type and severity of pain and evaluate response  - Implement non-pharmacological measures as appropriate and evaluate response  - Consider cultural and social influences on pain and pain management  - Notify physician/advanced practitioner if interventions unsuccessful or patient reports new pain  Outcome: Progressing     Problem: Knowledge Deficit  Goal: Patient/family/caregiver demonstrates understanding of disease process, treatment plan, medications, and discharge instructions  Description: Complete learning assessment and assess knowledge base.  Interventions:  - Provide teaching at level of understanding  - Provide teaching via preferred learning methods  Outcome: Progressing     Problem: DISCHARGE PLANNING  Goal: Discharge to home or other facility with appropriate resources  Description: INTERVENTIONS:  - Identify barriers to discharge w/patient and caregiver  - Arrange for needed discharge resources and transportation as appropriate  - Identify discharge learning needs (meds, wound care, etc.)  - Arrange for interpretive services to assist at discharge as needed  - Refer to Case Management Department for coordinating discharge planning if the patient needs post-hospital services based on physician/advanced practitioner order or complex needs related to functional status, cognitive ability, or social support system  Outcome: Progressing     Problem: SAFETY ADULT  Goal: Patient will remain free of falls  Description: INTERVENTIONS:  - Educate patient/family on patient safety including physical limitations  - Instruct patient to call for assistance with activity   - Consult OT/PT to assist with strengthening/mobility   - Keep Call bell within  reach  - Keep bed low and locked with side rails adjusted as appropriate  - Keep care items and personal belongings within reach  - Initiate and maintain comfort rounds  - Make Fall Risk Sign visible to staff  - Offer Toileting every  Hours, in advance of need  - Initiate/Maintain alarm  - Obtain necessary fall risk management equipment:   - Apply yellow socks and bracelet for high fall risk patients  - Consider moving patient to room near nurses station  Outcome: Progressing  Goal: Maintain or return to baseline ADL function  Description: INTERVENTIONS:  -  Assess patient's ability to carry out ADLs; assess patient's baseline for ADL function and identify physical deficits which impact ability to perform ADLs (bathing, care of mouth/teeth, toileting, grooming, dressing, etc.)  - Assess/evaluate cause of self-care deficits   - Assess range of motion  - Assess patient's mobility; develop plan if impaired  - Assess patient's need for assistive devices and provide as appropriate  - Encourage maximum independence but intervene and supervise when necessary  - Involve family in performance of ADLs  - Assess for home care needs following discharge   - Consider OT consult to assist with ADL evaluation and planning for discharge  - Provide patient education as appropriate  Outcome: Progressing  Goal: Maintains/Returns to pre admission functional level  Description: INTERVENTIONS:  - Perform AM-PAC 6 Click Basic Mobility/ Daily Activity assessment daily.  - Set and communicate daily mobility goal to care team and patient/family/caregiver.   - Collaborate with rehabilitation services on mobility goals if consulted  - Perform Range of Motion  times a day.  - Reposition patient every  hours.  - Dangle patient  times a day  - Stand patient  times a day  - Ambulate patient  times a day  - Out of bed to chair  times a day   - Out of bed for meals times a day  - Out of bed for toileting  - Record patient progress and toleration of  activity level   Outcome: Progressing     Problem: POSTPARTUM  Goal: Experiences normal postpartum course  Description: INTERVENTIONS:  - Monitor maternal vital signs  - Assess uterine involution and lochia  Outcome: Progressing  Goal: Appropriate maternal -  bonding  Description: INTERVENTIONS:  - Identify family support  - Assess for appropriate maternal/infant bonding   -Encourage maternal/infant bonding opportunities  - Referral to  or  as needed  Outcome: Progressing  Goal: Establishment of infant feeding pattern  Description: INTERVENTIONS:  - Assess breast/bottle feeding  - Refer to lactation as needed  Outcome: Progressing  Goal: Incision(s), wounds(s) or drain site(s) healing without S/S of infection  Description: INTERVENTIONS  - Assess and document dressing, incision, wound bed, drain sites and surrounding tissue  - Provide patient and family education  - Perform skin care/dressing changes every   Outcome: Progressing

## 2024-12-12 VITALS
DIASTOLIC BLOOD PRESSURE: 72 MMHG | WEIGHT: 183 LBS | TEMPERATURE: 98.4 F | SYSTOLIC BLOOD PRESSURE: 138 MMHG | RESPIRATION RATE: 18 BRPM | BODY MASS INDEX: 27.11 KG/M2 | OXYGEN SATURATION: 98 % | HEIGHT: 69 IN | HEART RATE: 98 BPM

## 2024-12-12 PROBLEM — O13.9 GESTATIONAL HYPERTENSION: Status: ACTIVE | Noted: 2024-12-12

## 2024-12-12 PROCEDURE — 99024 POSTOP FOLLOW-UP VISIT: CPT | Performed by: OBSTETRICS & GYNECOLOGY

## 2024-12-12 PROCEDURE — NC001 PR NO CHARGE: Performed by: OBSTETRICS & GYNECOLOGY

## 2024-12-12 RX ORDER — BENZOCAINE/MENTHOL 6 MG-10 MG
1 LOZENGE MUCOUS MEMBRANE DAILY PRN
Start: 2024-12-12

## 2024-12-12 NOTE — PLAN OF CARE
Problem: PAIN - ADULT  Goal: Verbalizes/displays adequate comfort level or baseline comfort level  Description: Interventions:  - Encourage patient to monitor pain and request assistance  - Assess pain using appropriate pain scale  - Administer analgesics based on type and severity of pain and evaluate response  - Implement non-pharmacological measures as appropriate and evaluate response  - Consider cultural and social influences on pain and pain management  - Notify physician/advanced practitioner if interventions unsuccessful or patient reports new pain  Outcome: Adequate for Discharge     Problem: Knowledge Deficit  Goal: Patient/family/caregiver demonstrates understanding of disease process, treatment plan, medications, and discharge instructions  Description: Complete learning assessment and assess knowledge base.  Interventions:  - Provide teaching at level of understanding  - Provide teaching via preferred learning methods  Outcome: Adequate for Discharge     Problem: DISCHARGE PLANNING  Goal: Discharge to home or other facility with appropriate resources  Description: INTERVENTIONS:  - Identify barriers to discharge w/patient and caregiver  - Arrange for needed discharge resources and transportation as appropriate  - Identify discharge learning needs (meds, wound care, etc.)  - Arrange for interpretive services to assist at discharge as needed  - Refer to Case Management Department for coordinating discharge planning if the patient needs post-hospital services based on physician/advanced practitioner order or complex needs related to functional status, cognitive ability, or social support system  Outcome: Adequate for Discharge     Problem: SAFETY ADULT  Goal: Patient will remain free of falls  Description: INTERVENTIONS:  - Educate patient/family on patient safety including physical limitations  - Instruct patient to call for assistance with activity   - Consult OT/PT to assist with  strengthening/mobility   - Keep Call bell within reach  - Keep bed low and locked with side rails adjusted as appropriate  - Keep care items and personal belongings within reach  - Initiate and maintain comfort rounds  - Make Fall Risk Sign visible to staff  - Offer Toileting  Outcome: Adequate for Discharge  Goal: Maintain or return to baseline ADL function  Description: INTERVENTIONS:  -  Assess patient's ability to carry out ADLs; assess patient's baseline for ADL function and identify physical deficits which impact ability to perform ADLs (bathing, care of mouth/teeth, toileting, grooming, dressing, etc.)  - Assess/evaluate cause of self-care deficits   - Assess range of motion  - Assess patient's mobility; develop plan if impaired  - Assess patient's need for assistive devices and provide as appropriate  - Encourage maximum independence but intervene and supervise when necessary  - Involve family in performance of ADLs  - Assess for home care needs following discharge   - Consider OT consult to assist with ADL evaluation and planning for discharge  - Provide patient education as appropriate  Outcome: Adequate for Discharge     Problem: POSTPARTUM  Goal: Experiences normal postpartum course  Description: INTERVENTIONS:  - Monitor maternal vital signs  - Assess uterine involution and lochia  Outcome: Adequate for Discharge  Goal: Appropriate maternal -  bonding  Description: INTERVENTIONS:  - Identify family support  - Assess for appropriate maternal/infant bonding   -Encourage maternal/infant bonding opportunities  - Referral to  or  as needed  Outcome: Adequate for Discharge  Goal: Establishment of infant feeding pattern  Description: INTERVENTIONS:  - Assess breast/bottle feeding  - Refer to lactation as needed  Outcome: Adequate for Discharge  Goal: Incision(s), wounds(s) or drain site(s) healing without S/S of infection  Description: INTERVENTIONS  - Assess and document  dressing, incision, wound bed, drain sites and surrounding tissue  - Provide patient and family education  - Perform skin care  Outcome: Adequate for Discharge

## 2024-12-12 NOTE — PROGRESS NOTES
"Progress Note - OB/GYN  Po Nieves 29 y.o. female MRN: [id number]  Unit/Bed#: -01 Encounter: [id number]    Assessment and Plan:   Po Nieves is a patient of: [facility]. She is PPD# 2 s/p  spontaneous vaginal delivery  Recovering well and is stable.     Gestational hypertension  Assessment & Plan  Systolic (24hrs), Av , Min:117 , Max:129     Diastolic (24hrs), Av, Min:67, Max:77       *  (spontaneous vaginal delivery)  Assessment & Plan  Recovering well   Routine postpartum care, encourage ambulation, encourage familial bonding  Lactation support for breast/bottle feeding as needed  FEN: Tolerating regular diet  Pain: Motrin/Tylenol around the clock, oxycodone if needed         Disposition    - Anticipate discharge home on PPD# 2  Barriers to discharge: None     Spring Villarreal MD  OBGYN PGY-1  2024 6:41 AM    Subjective/Objective     Chief Complaint: Postpartum State     Subjective:    Po Nieves is PPD#2 s/p  spontaneous vaginal delivery. She has no current complaints and had no acute events overnight.  Pain is well controlled.  Patient is currently voiding.  She is ambulating.  Patient is currently passing flatus and has had bowel movement. She is tolerating PO, and denies nausea or vomitting. Patient denies fever, chills, chest pain, shortness of breath, or calf tenderness. Lochia is normal. She is  Breastfeeding. She is recovering well and is stable.       Vitals:   /71 (BP Location: Left arm)   Pulse 73   Temp 97.7 °F (36.5 °C) (Oral)   Resp 18   Ht 5' 9\" (1.753 m)   Wt 83 kg (183 lb)   LMP 2024 (Exact Date)   SpO2 99%   Breastfeeding Yes   BMI 27.02 kg/m²     No intake or output data in the 24 hours ending 24 0641    Physical Exam:   GEN: Po Nieves appears well, alert and oriented x 3, pleasant and cooperative   CARDIO: RRR, intact distals pulses  RESP:  non-labored breathing  ABDOMEN: soft, no tenderness, fundus below " umbilicus  EXTREMITIES: Non tender, no erythema    Labs:   Recent Results (from the past 72 hours)   Type and screen    Collection Time: 12/09/24  3:56 PM   Result Value Ref Range    ABO Grouping B     Rh Factor Positive     Antibody Screen Negative     Specimen Expiration Date 20241212    CBC    Collection Time: 12/09/24  3:56 PM   Result Value Ref Range    WBC 11.77 (H) 4.31 - 10.16 Thousand/uL    RBC 4.37 3.81 - 5.12 Million/uL    Hemoglobin 11.1 (L) 11.5 - 15.4 g/dL    Hematocrit 35.0 34.8 - 46.1 %    MCV 80 (L) 82 - 98 fL    MCH 25.4 (L) 26.8 - 34.3 pg    MCHC 31.7 31.4 - 37.4 g/dL    RDW 14.6 11.6 - 15.1 %    Platelets 269 149 - 390 Thousands/uL    MPV 9.5 8.9 - 12.7 fL   L&D GREEN / YELLOW ON HOLD    Collection Time: 12/09/24  3:56 PM   Result Value Ref Range    Extra Tube Hold for add-ons.    RPR-Syphilis Screening (Total Syphilis IGG/IGM)    Collection Time: 12/09/24  3:56 PM   Result Value Ref Range    Syphilis Total Antibody Non-reactive Non-Reactive   CORD, Blood gas, venous    Collection Time: 12/10/24  2:29 PM   Result Value Ref Range    pH, Cord Gilbert 7.315 7.190 - 7.490    pCO2, Cord Gilbert 35.5 27.0 - 43.0 mm HG    pO2, Cord Gilbert 33.2 15.0 - 45.0 mm HG    HCO3, Cord Gilbert 17.7 12.2 - 28.6 mmol/L    Base Exc, Cord Gilbert -7.5 (L) 1.0 - 9.0 mmol/L    O2 Cont, Cord Gilbert 17.0 mL/dL    O2 HGB,VENOUS CORD 69.8 %   CORD, Blood gas, arterial    Collection Time: 12/10/24  2:29 PM   Result Value Ref Range    pH, Cord Art 7.259 7.230 - 7.430    pCO2, Cord Art 51.3 30.0 - 60.0    pO2, Cord Art 21.1 5.0 - 25.0 mm HG    HCO3, Cord Art 22.5 17.3 - 27.3 mmol/L    Base Exc, Cord Art -5.3 (L) 3.0 - 11.0 mmol/L    O2 Content, Cord Art 9.7 ml/dl    O2 Hgb, Arterial Cord 39.7 %       Meds:      Current Facility-Administered Medications:     acetaminophen (TYLENOL) tablet 650 mg, 650 mg, Oral, Q6H, Brandi Frey MD, 650 mg at 12/11/24 0422    benzocaine-menthol-lanolin-aloe (DERMOPLAST) 20-0.5 % topical spray 1 Application, 1  Application, Topical, Q6H PRN, Brandi Frey MD    calcium carbonate (TUMS) chewable tablet 1,000 mg, 1,000 mg, Oral, Daily PRN, Brandi Frey MD    hydrocortisone 1 % cream 1 Application, 1 Application, Topical, Daily PRN, Brandi Frey MD    ibuprofen (MOTRIN) tablet 600 mg, 600 mg, Oral, Q6H, Brandi Frey MD    ondansetron (ZOFRAN) injection 4 mg, 4 mg, Intravenous, Q8H PRN, Brandi Frey MD    oxytocin (PITOCIN) 30 Units in lactated ringers 500 mL infusion, 250 shalonda-units/min, Intravenous, Once, Brandi Frey MD    simethicone (MYLICON) chewable tablet 80 mg, 80 mg, Oral, 4x Daily PRN, Brandi Yvrose Frey, MD    witch hazel-glycerin (TUCKS) topical pad 1 Pad, 1 Pad, Topical, Q4H PRN, Brandi Frey MD

## 2024-12-12 NOTE — UTILIZATION REVIEW
"Mother and baby discharged 2024      NOTIFICATION OF INPATIENT ADMISSION   MATERNITY/DELIVERY AUTHORIZATION REQUEST   SERVICING FACILITY:   [facility] - L&D, [facility], NICU  [address] [place] [address]  Tax ID: [id number]  NPI: [id number]   ATTENDING PROVIDER:  Attending Name and NPI#: Yady Chavez Do [[id number]]  Address: [address] [place] [address]  Phone: [phone]   ADMISSION INFORMATION:  Place of Service: Inpatient [facility]  Place of Service Code: 21  Inpatient Admission Date/Time: 24  3:03 PM  Discharge Date/Time: 2024 12:00 PM  Admitting Diagnosis Code/Description:  Encounter for induction of labor [Z34.90]  Encounter for full-term uncomplicated delivery [O80]     Mother: Po Nieves 1995 Estimated Date of Delivery: 24  Delivering clinician: Yady Chavez   OB History          1    Para   1    Term   1       0    AB   0    Living   1         SAB   0    IAB   0    Ectopic   0    Multiple   0    Live Births   1               [facility] Name & MRN:   Information for the patient's :  Ezequiel Baby Boy (Po) [[id number]]   [facility] Delivery Information:  Sex: male  Delivered 12/10/2024 2:26 PM by Vaginal, Spontaneous; Gestational Age: 39w1d     Measurements:  Weight: 7 lb 10.6 oz (3475 g);  Height: 18.5\"    APGAR 1 minute 5 minutes 10 minutes   Totals: 9 9       UTILIZATION REVIEW CONTACT:  [facility] [place] Utilization   Network Utilization Review Department  Phone: [phone]  Fax [phone]  Email: [email or web address]  Contact for approvals/pending authorizations, clinical reviews, and discharge.     PHYSICIAN ADVISORY SERVICES:  Medical Necessity Denial & Ttub-xr-Atkk Review  Phone: [phone]  Fax: [phone]  Email: [email or web address]     DISCHARGE SUPPORT TEAM:  For Patients Discharge Needs & Updates  Phone: " [phone] opt. 2 Fax: [phone]  Email: [email or web address]

## 2024-12-12 NOTE — PLAN OF CARE
Problem: PAIN - ADULT  Goal: Verbalizes/displays adequate comfort level or baseline comfort level  Description: Interventions:  - Encourage patient to monitor pain and request assistance  - Assess pain using appropriate pain scale  - Administer analgesics based on type and severity of pain and evaluate response  - Implement non-pharmacological measures as appropriate and evaluate response  - Consider cultural and social influences on pain and pain management  - Notify physician/advanced practitioner if interventions unsuccessful or patient reports new pain  Outcome: Progressing     Problem: Knowledge Deficit  Goal: Patient/family/caregiver demonstrates understanding of disease process, treatment plan, medications, and discharge instructions  Description: Complete learning assessment and assess knowledge base.  Interventions:  - Provide teaching at level of understanding  - Provide teaching via preferred learning methods  Outcome: Progressing     Problem: DISCHARGE PLANNING  Goal: Discharge to home or other facility with appropriate resources  Description: INTERVENTIONS:  - Identify barriers to discharge w/patient and caregiver  - Arrange for needed discharge resources and transportation as appropriate  - Identify discharge learning needs (meds, wound care, etc.)  - Arrange for interpretive services to assist at discharge as needed  - Refer to Case Management Department for coordinating discharge planning if the patient needs post-hospital services based on physician/advanced practitioner order or complex needs related to functional status, cognitive ability, or social support system  Outcome: Progressing     Problem: SAFETY ADULT  Goal: Patient will remain free of falls  Description: INTERVENTIONS:  - Educate patient/family on patient safety including physical limitations  - Instruct patient to call for assistance with activity   - Consult OT/PT to assist with strengthening/mobility   - Keep Call bell within  reach  - Keep bed low and locked with side rails adjusted as appropriate  - Keep care items and personal belongings within reach  - Initiate and maintain comfort rounds  - Make Fall Risk Sign visible to staff  - Offer Toileting  Outcome: Progressing  Goal: Maintain or return to baseline ADL function  Description: INTERVENTIONS:  -  Assess patient's ability to carry out ADLs; assess patient's baseline for ADL function and identify physical deficits which impact ability to perform ADLs (bathing, care of mouth/teeth, toileting, grooming, dressing, etc.)  - Assess/evaluate cause of self-care deficits   - Assess range of motion  - Assess patient's mobility; develop plan if impaired  - Assess patient's need for assistive devices and provide as appropriate  - Encourage maximum independence but intervene and supervise when necessary  - Involve family in performance of ADLs  - Assess for home care needs following discharge   - Consider OT consult to assist with ADL evaluation and planning for discharge  - Provide patient education as appropriate  Outcome: Progressing     Problem: POSTPARTUM  Goal: Experiences normal postpartum course  Description: INTERVENTIONS:  - Monitor maternal vital signs  - Assess uterine involution and lochia  Outcome: Progressing  Goal: Appropriate maternal -  bonding  Description: INTERVENTIONS:  - Identify family support  - Assess for appropriate maternal/infant bonding   -Encourage maternal/infant bonding opportunities  - Referral to  or  as needed  Outcome: Progressing  Goal: Establishment of infant feeding pattern  Description: INTERVENTIONS:  - Assess breast/bottle feeding  - Refer to lactation as needed  Outcome: Progressing  Goal: Incision(s), wounds(s) or drain site(s) healing without S/S of infection  Description: INTERVENTIONS  - Assess and document dressing, incision, wound bed, drain sites and surrounding tissue  - Provide patient and family education  -  Perform skin care  Outcome: Progressing

## 2024-12-12 NOTE — ASSESSMENT & PLAN NOTE
Recovering well   Routine postpartum care, encourage ambulation, encourage familial bonding  Lactation support for breast/bottle feeding as needed  FEN: Tolerating regular diet  Pain: Motrin/Tylenol around the clock, oxycodone if needed

## 2024-12-12 NOTE — LACTATION NOTE
This note was copied from a baby's chart.  CONSULT - LACTATION  Baby Boy Nieves (Alexis) 2 days male MRN: [id number]    [facility] AN NURSERY Room / Bed: (N)/(N) Encounter: [id number]    Maternal Information     MOTHER:  Po Nieves  Maternal Age: 29 y.o.  OB History: # 1 - Date: 12/10/24, Sex: Male, Weight: 3475 g (7 lb 10.6 oz), GA: 39w1d, Type: Vaginal, Spontaneous, Apgar1: 9, Apgar5: 9, Living: Living, Birth Comments: None   Previouse breast reduction surgery? No    Lactation history:   Has patient previously breast fed: No   How long had patient previously breast fed:     Previous breast feeding complications:     History reviewed. No pertinent surgical history.    Birth information:  YOB: 2023   Time of birth: 2:26 PM   Sex: male   Delivery type: Vaginal, Spontaneous   Birth Weight: 3475 g (7 lb 10.6 oz)   Percent of Weight Change: -5%     Gestational Age: 39w1d   [unfilled]    Assessment     Breast and nipple assessment:  round breasts with everted nipples     [place] Assessment: normal assessment    Feeding assessment: feeding well  LATCH:  Latch: Grasps breast, tongue down, lips flanged, rhythmic sucking   Audible Swallowing: Spontaneous and intermittent (24 hours old)   Type of Nipple: Everted (After stimulation)   Comfort (Breast/Nipple): Soft/non-tender   Hold (Positioning): No assist from staff, mother able to position/hold infant   LATCH Score: 10           24 0900   Lactation Consultation   Reason for Consult 10 minutes;20 min   Lactation Consultant Total Time 30   Maternal Information   Has mother  before? No   Infant to breast within first hour of birth? Yes   Exclusive Pump and Bottle Feed No   LATCH Documentation   Latch 2   Audible Swallowing 2   Type of Nipple 2   Comfort (Breast/Nipple) 2   Hold (Positioning) 2   LATCH Score 10   Having latch problems? No   Position(s) Used Football   Breasts/Nipples   Left Breast  Soft   Right Breast Soft   Left Nipple Everted   Right Nipple Everted   Intervention Hand expression   Breastfeeding Progress Not yet established;Breastfeeding well   Breast Pump   Pump 3  (has pump through insurance)   Patient Follow-Up   Lactation Consult Status 2   Follow-Up Type Inpatient;Call as needed   Other OB Lactation Documentation    Additional Problem Noted Mom latched baby independently to breast upon arrival into room. baby latched deeply at breast. Reviewed cluster feedings and initial engorgement. Enc to call for further assistance.     Feeding recommendations:  breast feed on demand  Mom latched baby independently to breast upon arrival into room. baby latched deeply at breast. Reviewed cluster feedings and initial engorgement. Mom states breastfeeding is going better today. Mom denies any pain or soreness at breast. Enc to call for further assistance.    Discussed 2nd night syndrome and ways to calm infant. Hand out given. Information on hand expression given. Discussed benefits of knowing how to manually express breast including stimulating milk supply, softening nipple for latch and evacuating breast in the event of engorgement.    Discussed s/s engorgement, blocked milk ducts, and mastitis. Discussed how to remedy at home and when to contact physician. Reviewed engorgement and ways to reduce symptoms. Use a warmth on breasts with massage prior to feeding / pumping. Use massage during pumping over full ducts. Used cold, compression on breasts after feeding/pumping session. Ideas are rice in a sock. Place one in the freezer for cool and one in the microwave for warmth. Referred to discharge book / engorgement page.    Encouraged parents to call for assistance, questions, and concerns about breastfeeding.  Extension provided.      Katy Meredith MA 12/12/2024 9:17 AM

## 2024-12-17 LAB — PLACENTA IN STORAGE: NORMAL

## 2024-12-18 ENCOUNTER — CLINICAL SUPPORT (OUTPATIENT)
Dept: OBGYN CLINIC | Facility: CLINIC | Age: 29
End: 2024-12-18

## 2024-12-18 VITALS
WEIGHT: 164.8 LBS | HEIGHT: 69 IN | DIASTOLIC BLOOD PRESSURE: 72 MMHG | SYSTOLIC BLOOD PRESSURE: 122 MMHG | BODY MASS INDEX: 24.41 KG/M2

## 2024-12-18 DIAGNOSIS — O13.9 GESTATIONAL HYPERTENSION, ANTEPARTUM: Primary | ICD-10-CM

## 2024-12-18 PROCEDURE — NURSE

## 2024-12-18 NOTE — PROGRESS NOTES
BP Check    Patient's Name: Po Nieves  : 1995     BP Today: blood pressure     Diagnosis: {BPCheckDiagnosis:47331}    Current Medications: {BpCheckMeds:66608} Dose: ***      Currently Pregnant _____   Postpartum _____   Delivery Method: Vaginal, Spontaneous   Delivery Date and Time: 12/10/2024 2:26 PM     Provider’s Recommendations: ***

## 2024-12-18 NOTE — PROGRESS NOTES
BP Check    Patient's Name: Po Nieves  : 1995     BP Today: blood pressure 122/72    Pt feels good, no headaches, no vision changes     Diagnosis: Other    Current Medications: None Dose:       Currently Pregnant _____   Postpartum _____   Delivery Method: Vaginal, Spontaneous   Delivery Date and Time: 12/10/2024 2:26 PM     Provider’s Recommendations: keep PP visit

## 2024-12-20 ENCOUNTER — OFFICE VISIT (OUTPATIENT)
Age: 29
End: 2024-12-20
Payer: COMMERCIAL

## 2024-12-20 PROCEDURE — 99404 PREV MED CNSL INDIV APPRX 60: CPT | Performed by: PEDIATRICS

## 2024-12-20 NOTE — PROGRESS NOTES
INITIAL BREAST FEEDING EVALUATION    Informant/Relationship: Po (mom/self), Hunter (FOB)    Discussion of General Lactation Issues: Po reports that baby is getting sucking blisters. She also has pumping questions. So far his latch is not painful but seems shallow and he is drinking actively when on the breast.     Infant is 10 days old today.        History:  Fertility Problem:no  Breast changes:yes - enlargement   :  elective induction, progressed well. In labor for 23 hours, pushed for 40 minutes   Full term:yes - 39 and 1    labor:no  First nursing/attempt < 1 hour after birth:yes - this was a good latch   Skin to skin following delivery:yes - immediately   Breast changes after delivery:yes - 4-5 days   Rooming in (infant in room with mother with exception of procedures, eg. Circumcision: yes - entire stay   Blood sugar issues:no  NICU stay:no  Jaundice:yes - just followed labs   Phototherapy:no  Supplement given: (list supplement and method used as well as reason(s):no    Past Medical History:   Diagnosis Date    Anxiety     Never documented         Current Outpatient Medications:     benzocaine-menthol-lanolin-aloe (DERMOPLAST) 20-0.5 % topical spray, Apply 1 Application topically every 6 (six) hours as needed for mild pain, Disp: , Rfl:     hydrocortisone 1 % cream, Apply 1 Application topically daily as needed for irritation, Disp: , Rfl:     Prenatal MV-Min-Fe Fum-FA-DHA (PRENATAL 1 PO), Take by mouth, Disp: , Rfl:     witch hazel-glycerin (TUCKS) topical pad, Apply 1 Pad topically every 4 (four) hours as needed for irritation, Disp: , Rfl:     No Known Allergies    Social History     Substance and Sexual Activity   Drug Use Never       Social History     Interval Breastfeeding History:    Frequency of breast feeding: every 2.5 hours   Does mother feel breastfeeding is effective: Yes  Does infant appear satisfied after nursing:Yes  Stooling pattern normal: lYes  Urinating  frequently:Yes  Using shield or shells: No    Alternative/Artificial Feedings:   Bottle: No  Cup: No  Syringe/Finger: No           Formula Type: no                     Amount: n/a            Breast Milk:                      Amount: only directly from the breast            Elimination Problems: No      Equipment:  Nipple Shield             Type: n/a             Size: n/a             Frequency of Use: n/a  Pump            Type: Spectra S1             Frequency of Use: not yet using       Equipment Problems: yes just first time pump user, has not attempted use just yet     Mom:  Breast: Normal  Nipple Assessment in General: Normal: elongated/eraser, no discoloration and no damage noted.  Mother's Awareness of Feeding Cues                 Recognizes: Yes                  Verbalizes: Yes  Support System: good support   History of Breastfeeding: first time breastfeeding   Changes/Stressors/Violence: Mom wants to ensure Antonio is feeding as effectively as he can be   Concerns/Goals: Po would like to continue breastfeeding, would like to discuss incorporating pumping to built a stash for her return to work.     Problems with Mom: none    Physical Exam  Constitutional:       Appearance: Normal appearance.   HENT:      Head: Normocephalic.   Pulmonary:      Effort: Pulmonary effort is normal.   Musculoskeletal:         General: Normal range of motion.      Cervical back: Normal range of motion.   Neurological:      General: No focal deficit present.      Mental Status: She is alert and oriented to person, place, and time.   Skin:     General: Skin is warm.      Capillary Refill: Capillary refill takes less than 2 seconds.   Psychiatric:         Mood and Affect: Mood normal.         Behavior: Behavior normal.         Thought Content: Thought content normal.         Judgment: Judgment normal.       Infant:  Behaviors: Alert  Color: Pink  Birth weight: 3475 g   Current weight: 3430 g    Problems with infant: jaw  tension      General Appearance:  Alert, active, no distress                             Head:  Normocephalic, AFOF, sutures opposed                             Eyes:  Conjunctiva clear, no drainage                              Ears:  Normally placed, no anomolies                             Nose:  Septum intact, no drainage or erythema                           Mouth:  No lesions. Tongue is flat at rest. Does not lateralize well today. He is not wiling to suck on my finger for more than a few sucks, with a poor cup. Manual lift is difficult due to jaw tension. Frenulum appears to be attached at the floor of the mouth and well behind the tip of the tongue.                    Neck:  Supple, symmetrical, trachea midline                 Respiratory:  No grunting, flaring, retractions, breath sounds clear and equal            Cardiovascular:  Regular rate and rhythm. No murmur. Adequate perfusion/capillary refill. Femoral pulse present                    Abdomen:   Soft, non-tender, no masses, bowel sounds present, no HSM             Genitourinary:  Normal male, testes descended, no discharge, swelling, or pain, anus patent                          Spine:   No abnormalities noted        Musculoskeletal:  Full range of motion          Skin/Hair/Nails:   Skin warm, dry, and intact, no rashes or abnormal dyspigmentation or lesions                Neurologic:   No abnormal movement, tone appropriate for gestational age    Elida Latch:  Efficiency:               Lips Flanged: Yes              Depth of latch: wide              Audible Swallow: Yes              Visible Milk: Yes              Wide Open/ Asymmetrical: Yes              Suck Swallow Cycle: Breathing: yes, Coordinated: yes  Nipple Assessment after latch: Normal: elongated/eraser, no discoloration and no damage noted.  Latch Problems: None today. Reviewed BN hold, he is latched deeply after some practice. Lower up is flanged and upper lip is neutral.  "    Position:  Infant's Ergonomics/Body               Body Alignment: Yes               Head Supported: Yes               Close to Mom's body/ Lifted/ Supported: Yes               Mom's Ergonomics/Body: Yes                           Supported: Yes                           Sitting Back: Yes                           Brings Baby to her breast: Yes  Positioning Problems: Reviewed BN hold and Mom returns this demonstration well.         Education:  Reviewed Latch: importance of deep latch without pain.   Reviewed Positioning for Dyad: proper alignment and head angle when positioning at the breast   Reviewed Frequency/Supply & Demand: offer the breast at each feeding, pump if baby is not latching and effective transferring milk.   Reviewed Infant:Cues and varied States of Awareness: watch for hunger cues, feed on demand. If baby seems satisfied at the breast (calm, relaxed sleeping, breasts are softer) no need to pump or supplement   Reviewed Infant Elimination: goal of 6+ wets and 2-3 stools per day   Reviewed Alternative/Artificial Feedings: paced bottle feeding technique demonstrated  Reviewed Mom/Breast care: gentle handling of the breast at all times, as well as tips for healing sore nipples.    Reviewed Equipment: Hand pump and electric pump general guidance, Discussed proper flange fit, how to measure        Plan:      Reassurance provided that baby is growing very well at this time. Cont with positioning adjustments and watch for signs of effective feeding on the breast. Pump only if wanting to replace feeding at the breast with bottle feeding or if latching becomes painful. If saving \"bonus\" milk, save only small volumes daily. Gentle handling of the breast at all times to preserve integrity.  Contact [facility] for breastfeeding support as needed or ongoing concerns with latching comfort and milk transfer.     I have spent 90 minutes with Patient and family today in which greater than 50% of this time " was spent in counseling/coordination of care regarding Patient and family education.

## 2024-12-20 NOTE — PATIENT INSTRUCTIONS
"-Great meeting with you and your sweet family today! Antonio is growing beautifully!   -Watch for signs throughout the feeding that he is latched deeply and effectively removing milk. You will feel strong tugging, hear swallowing and will feel your breasts get softer after nursing.   -No need to pump if baby is latching and feeding well at the breast on demand.   -Pump only as needed for missed feedings at the breast or for uncomfortable engorgement, utilize flange fit and settings that are comfortable for you, pumping should not be painful!   --Cycle pumping : high speed, low suction until you see your milk flow, then switch to a lower speed and higher suction to express the milk efficiently. Continue to gradually increase suction and decrease speed throughout the session. You may cycle back into \"massage mode\" to stimulate another letdown when you see your milk flow slow within the pumping session or at the end of the pumping session.    -Wear a supportive, but not tight, bra. Sit comfortably reclined when nursing or pumping, and throughout the day when possible.    -If hoping to build a \"stash\" please keep in mind that we recommend you do so slowly to prevent taking too much milk from what Antonio needs and creating and oversupply. Save only small volumes daily (1-2 oz) if desired. You will also hopefully be able to pump throughout the workday to continue to replenish the milk he is drinking via bottle when he is not with you.   -Follow up as needed for ongoing feeding and pumping.   - Storing and Thawing Breast Milk  [facility] Breastfeeding Support ([place]da.gov)    -Here is a research based resource for common breastfeeding questions or concerns : - EVRST   -Please call or scheduled a follow up appointment with lactation as needed for questions or concerns you may have.     "

## 2024-12-22 NOTE — PROGRESS NOTES
I have reviewed the notes, assessments, and/or procedures performed by Ayde Lowery RN, IBCLC, I concur with her/his documentation of Po Mcgarry MD 12/22/24

## 2024-12-26 ENCOUNTER — TELEPHONE (OUTPATIENT)
Dept: OBGYN CLINIC | Facility: CLINIC | Age: 29
End: 2024-12-26

## 2025-01-21 ENCOUNTER — POSTPARTUM VISIT (OUTPATIENT)
Dept: OBGYN CLINIC | Facility: CLINIC | Age: 30
End: 2025-01-21

## 2025-01-21 VITALS
BODY MASS INDEX: 23.7 KG/M2 | DIASTOLIC BLOOD PRESSURE: 66 MMHG | SYSTOLIC BLOOD PRESSURE: 114 MMHG | WEIGHT: 160 LBS | HEIGHT: 69 IN

## 2025-01-21 PROBLEM — O36.8190 DECREASED FETAL MOVEMENT: Status: RESOLVED | Noted: 2024-11-21 | Resolved: 2025-01-21

## 2025-01-21 PROBLEM — O13.9 GESTATIONAL HYPERTENSION: Status: RESOLVED | Noted: 2024-12-12 | Resolved: 2025-01-21

## 2025-01-21 PROBLEM — Z34.93 ENCOUNTER FOR SUPERVISION OF NORMAL PREGNANCY IN THIRD TRIMESTER: Status: RESOLVED | Noted: 2024-08-15 | Resolved: 2025-01-21

## 2025-01-21 PROCEDURE — 99024 POSTOP FOLLOW-UP VISIT: CPT | Performed by: OBSTETRICS & GYNECOLOGY

## 2025-01-21 NOTE — PROGRESS NOTES
Assessment & Plan     Normal postpartum exam.     1. Contraception: none.  2. Annual exam due in .   3. Increase activity as tolerated, may resume all normal activity.  4. Lactation consult needed: yes; if yes, [facility] information discussed.         Subjective   Chief Complaint   Patient presents with    Postpartum Care       Po Nieves is a 29 y.o.  female who presents for a postpartum visit.     Delivery Method: Vaginal, Spontaneous   Delivery Date and Time: 12/10/2024 2:26 PM  Delivery Attending: Yady Chavez  Gestational Age at delivery: 39w1d   Route of Delivery: Vaginal, Spontaneous      Admitting Diagnoses:   Patient Active Problem List   Diagnosis     (spontaneous vaginal delivery)    Encounter for supervision of normal pregnancy in third trimester    Decreased fetal movement    Gestational hypertension       Gestational Diabetes: no  Pregnancy Complications: gestational HTN    Anesthesia: Epidural,     Delivery: Vaginal, Spontaneous at 12/10/2024 2:26 PM  Laceration: Perineal: 2° Repaired?  y    Baby's Name: Antonio  Baby's Weight: 3475 g (7 lb 10.6 oz); 122.58    Apgar scores: 9  and 9  at 1 and 5 minutes, respectively  Breast or formula: Breastfeeding  Pediatrician: Christian Bailey    Bleeding none. Bowel function: normal. Bladder function: normal. The patient is not having any pain.     Patient has not been sexually active. Desired contraception method is none.    Postpartum depression screening: negative. EPDS : 0. She denies depression/anxiety/trouble sleeping.     Last Pap : 2023  ; no abnormalities      The following portions of the patient's history were reviewed and updated as appropriate: allergies, current medications, past family history, past medical history, past social history, past surgical history, and problem list.      Current Outpatient Medications:     Prenatal MV-Min-Fe Fum-FA-DHA (PRENATAL 1 PO), Take by mouth, Disp: , Rfl:      "benzocaine-menthol-lanolin-aloe (DERMOPLAST) 20-0.5 % topical spray, Apply 1 Application topically every 6 (six) hours as needed for mild pain (Patient not taking: Reported on 1/21/2025), Disp: , Rfl:     hydrocortisone 1 % cream, Apply 1 Application topically daily as needed for irritation (Patient not taking: Reported on 1/21/2025), Disp: , Rfl:     witch hazel-glycerin (TUCKS) topical pad, Apply 1 Pad topically every 4 (four) hours as needed for irritation (Patient not taking: Reported on 1/21/2025), Disp: , Rfl:     No Known Allergies    Review of Systems  Review of Systems   Constitutional:  Negative for chills and fever.   Respiratory:  Negative for shortness of breath.    Cardiovascular:  Negative for chest pain and leg swelling.   Gastrointestinal:  Negative for abdominal distention, abdominal pain, constipation, nausea and vomiting.   Genitourinary:  Negative for dysuria, frequency, urgency, vaginal bleeding and vaginal discharge.   Neurological:  Negative for headaches.         Objective      /66 (BP Location: Right arm, Patient Position: Sitting, Cuff Size: Standard)   Ht 5' 9\" (1.753 m)   Wt 72.6 kg (160 lb)   LMP 03/02/2024 (Exact Date)   Breastfeeding Yes   BMI 23.63 kg/m²     Physical Exam  Constitutional:       General: She is not in acute distress.     Appearance: Normal appearance. She is well-developed. She is not ill-appearing.   Pulmonary:      Effort: Pulmonary effort is normal. No respiratory distress.   Neurological:      General: No focal deficit present.      Mental Status: She is alert and oriented to person, place, and time.   Psychiatric:         Mood and Affect: Mood normal.         Behavior: Behavior normal.         Thought Content: Thought content normal.         Judgment: Judgment normal.   Vitals and nursing note reviewed.              "

## 2025-02-03 ENCOUNTER — NURSE TRIAGE (OUTPATIENT)
Age: 30
End: 2025-02-03

## 2025-02-03 NOTE — TELEPHONE ENCOUNTER
"Spoke with patient who reports she had intercourse 1/21 and felt irritated.  She advised over the weekend she started with green vaginal discharge but denies odor or other symptoms.  She was made an appt for tomorrow.  No further questions or concerns at this time.    Reason for Disposition   Abnormal color vaginal discharge (i.e., yellow, green, gray)    Answer Assessment - Initial Assessment Questions  1. DISCHARGE: \"Describe the discharge.\" (e.g., white, yellow, green, gray, foamy, cottage cheese-like)      Green   2. ODOR: \"Is there a bad odor?\"      denies  3. ONSET: \"When did the discharge begin?\"      This past weekend  4. RASH: \"Is there a rash in the genital area?\" If Yes, ask: \"Describe it.\" (e.g., redness, blisters, sores, bumps)      denies  5. ABDOMEN PAIN: \"Are you having any abdomen pain?\" If Yes, ask: \"What does it feel like? \" (e.g., crampy, dull, intermittent, constant)       Intermittent   7. CAUSE: \"What do you think is causing the discharge?\" \"Have you had the same problem before?\" \"What happened then?\"      unsure  8. OTHER SYMPTOMS: \"Do you have any other symptoms?\" (e.g., fever, itching, vaginal bleeding, pain with urination, injury to genital area, vaginal foreign body)      Painful intercourse  9. PREGNANCY: \"Is there any chance you are pregnant?\" \"When was your last menstrual period?\"      Has not got a period back.    Protocols used: Vaginal Discharge-Adult-[place]    "

## 2025-02-04 ENCOUNTER — OFFICE VISIT (OUTPATIENT)
Dept: OBGYN CLINIC | Facility: CLINIC | Age: 30
End: 2025-02-04

## 2025-02-04 VITALS
HEIGHT: 69 IN | BODY MASS INDEX: 23.11 KG/M2 | DIASTOLIC BLOOD PRESSURE: 56 MMHG | WEIGHT: 156 LBS | SYSTOLIC BLOOD PRESSURE: 110 MMHG

## 2025-02-04 DIAGNOSIS — N94.10 DYSPAREUNIA IN FEMALE: Primary | ICD-10-CM

## 2025-02-04 DIAGNOSIS — B37.31 VAGINA, CANDIDIASIS: ICD-10-CM

## 2025-02-04 PROCEDURE — 99024 POSTOP FOLLOW-UP VISIT: CPT | Performed by: OBSTETRICS & GYNECOLOGY

## 2025-02-04 RX ORDER — FLUCONAZOLE 150 MG/1
150 TABLET ORAL
Qty: 3 TABLET | Refills: 0 | Status: SHIPPED | OUTPATIENT
Start: 2025-02-04 | End: 2025-02-11

## 2025-02-05 NOTE — PROGRESS NOTES
Assessment/Plan:      Diagnoses and all orders for this visit:    Dyspareunia in female  -     Ambulatory Referral to Physical Therapy; Future    Vagina, candidiasis  -     fluconazole (DIFLUCAN) 150 mg tablet; Take 1 tablet (150 mg total) by mouth every 3 (three) days for 3 doses          Subjective:     Patient ID: Po Nieves is a 29 y.o. female.    HPI  29y  who is 8 weeks PP from an .  She had a 2nd degree laceration that was repaired.  She has attempted intercourse 1 time but had pain at the introitus.  She also notes a green discharge.  There is no odor but some irritation.  No urinary or bowel symptoms.  Review of Systems   Constitutional: Negative.    HENT: Negative.     Respiratory: Negative.     Cardiovascular: Negative.    Gastrointestinal: Negative.    Genitourinary:  Positive for dyspareunia, vaginal discharge and vaginal pain. Negative for frequency, genital sores, menstrual problem, pelvic pain, urgency and vaginal bleeding.   Psychiatric/Behavioral: Negative.           Objective:     Physical Exam  Vitals and nursing note reviewed.   Constitutional:       Appearance: Normal appearance. She is normal weight.   HENT:      Head: Normocephalic and atraumatic.   Eyes:      Extraocular Movements: Extraocular movements intact.      Conjunctiva/sclera: Conjunctivae normal.      Pupils: Pupils are equal, round, and reactive to light.   Pulmonary:      Effort: Pulmonary effort is normal.   Genitourinary:     Labia:         Right: No rash, tenderness, lesion or injury.         Left: No rash, tenderness, lesion or injury.       Vagina: Vaginal discharge and tenderness present. No lesions.      Cervix: Normal.      Comments: Perineal, introitus pain at laceration scar that is healed  Skin:     General: Skin is warm and dry.   Neurological:      General: No focal deficit present.      Mental Status: She is alert and oriented to person, place, and time.            Nick Mcdonough Sr. was seen in the clinic today for a hearing evaluation. Mr. Mcdonough reported bilateral hearing loss, aural fullness, occasional tinnitus and a history of noise exposure.      Otoscopy was unremarkable. Audiological testing revealed a mild to severe mid to high frequency sensorineural hearing loss in both ears. A speech reception threshold was obtained at 20 dBHL, bilaterally. Speech recognition was 92% in the left ear and 84% in the right ear.    Tympanometry revealed normal Type A tympanograms in both ears.    Recommendations:  1. Otologic evaluation  2. Annual hearing evaluation  3. Hearing aid consult

## 2025-02-24 ENCOUNTER — EVALUATION (OUTPATIENT)
Dept: PHYSICAL THERAPY | Facility: CLINIC | Age: 30
End: 2025-02-24
Payer: COMMERCIAL

## 2025-02-24 DIAGNOSIS — M62.89 PELVIC FLOOR TENSION: Primary | ICD-10-CM

## 2025-02-24 DIAGNOSIS — N94.10 DYSPAREUNIA IN FEMALE: ICD-10-CM

## 2025-02-24 PROCEDURE — 97530 THERAPEUTIC ACTIVITIES: CPT | Performed by: PHYSICAL THERAPIST

## 2025-02-24 PROCEDURE — 97140 MANUAL THERAPY 1/> REGIONS: CPT | Performed by: PHYSICAL THERAPIST

## 2025-02-24 PROCEDURE — 97162 PT EVAL MOD COMPLEX 30 MIN: CPT | Performed by: PHYSICAL THERAPIST

## 2025-02-24 NOTE — PROGRESS NOTES
PT Evaluation     Today's date: 2025  Patient name: Po Nieves  : 1995  MRN: [id number]  Referring provider: Winsome Jain*  Dx: No diagnosis found.               Assessment  Impairments: abnormal muscle firing, abnormal muscle tone, abnormal or restricted ROM, impaired physical strength, lacks appropriate home exercise program, poor posture , participation limitations and activity limitations    Assessment details: Po Nieves is a 29 y.o. year-old female who presents with reports of muscle tightness and increased muscle tension in PFM,  Internal assessment of pelvic floor muscles revealed weakness at 3rd layer especially with decreased endurance and decreased fast flick reps noted. This is affecting overall pelvic floor function in order to maintain proper support of visceral structures and possibly contributing to her stress incontinence that happens on occasion. Bladder diary provided to patient today to fill out to review other personal and external factors that may be contributing to her current complaints at this time. She will benefit from skilled PT for pelvic floor rehab to improve PFM and core strength, improve proper breathing mechanics, improve relaxation of PFM and glut musculature, and improve overall quality of life. Patient would benefit from skilled PT services to address these impairments and to maximize function.  Thank you for the referral.     Understanding of Dx/Px/POC: good     Prognosis: good    Goals  Impairment Goals 4-6 weeks   In order to maximize function patient will be able to...   - Decrease intensity/duration/frequency of pain to ***/10  - Patient will report reduced leakage with daily activities such as coughing, sneezing, etc.   - Improve PERF score to WNLs to improve PFM contraction with ADLs  - Increase core and PFM strength to 4/5 throughout to maintain proper stability and support for visceral structures  - Increase hip strength to 4/5  throughout to improve PFM and overall pelvic stability with ADLs  - Demonstrate improved hip flexibility as demonstrated by increased ROM through therapeutic exercise    Functional Goals 6-8 weeks  In order to return to prior level of function patient will be able to...   - Participate in ADL's/IADL's/sport specific activities with no greater than 2/10 pain.   - Demonstrate independence and compliant with HEP  - Demonstrate functional activities with good core/glute/PF muscle strength without compensation/pain/difficulty   - Demonstrate a squat and or sit to stand with good mechanics and eccentric control without pain/difficulty/leakage  - Ascend and descend stairs without increased pain/difficulty/leakage and a reciprocal gait pattern.  - Lift >*** pounds and perform work related tasks with proper mechanics      Plan  Patient would benefit from: skilled physical therapy  Planned modality interventions: cryotherapy, electrical stimulation/Russian stimulation and thermotherapy: hydrocollator packs    Planned therapy interventions: balance/weight bearing training, body mechanics training, flexibility, functional ROM exercises, transfer training, home exercise program, therapeutic activities, therapeutic exercise, stretching, strengthening, massage, Avendano taping, neuromuscular re-education, patient education, manual therapy, postural training, joint mobilization, IASTM, kinesiology taping and nerve gliding    Frequency: 1-2x week  Duration in weeks: 4  Treatment plan discussed with: patient, PTA and referring physician    PT Pelvic Floor Subjective:   History of Present Illness:   Po Nieves is a 29 y.o. year-old female who presents ***      Objective            POC Expires Auth Status Start Date Expiration Date PT Visit Limit           Date        Used        Remaining           Diagnosis: ***   Precautions: ***   Next Physician's Appt:   [facility] HEP:   Manuals                                                 Neuro Re-Ed                                                                Ther Ex                                                                         Ther Activity                                           Pt Ed        Re-Evaluation             Modalities

## 2025-02-24 NOTE — PROGRESS NOTES
PT Evaluation     Today's date: 2025  Patient name: Po Nieves  : 1995  MRN: [id number]  Referring provider: Winsome Jain*  Dx:   Encounter Diagnosis     ICD-10-CM    1. Pelvic floor tension  M62.89       2. Dyspareunia in female  N94.10 Ambulatory Referral to Physical Therapy          Start Time: 745  Stop Time: 0845  Total time in clinic (min): 60 minutes    Assessment  Impairments: abnormal muscle firing, abnormal muscle tone, abnormal or restricted ROM, impaired physical strength, lacks appropriate home exercise program, poor posture , participation limitations and activity limitations    Assessment details: Po Nieves is a 29 y.o. year-old female who presents post partum 10 weeks delivered (vaginally) on 12/10/2024, with reports of pain with intercourse attempted at around 6-8 weeks post-partum. Internal assessment of pelvic floor muscles revealed significant pain at insertion with difficulty to penetrate due to pain. Thus, unable to assessed 2nd and 3rd layer pelvic floor strength and integrity. Patient had difficulty with relaxation of pelvic floor as well, despite cuing. This affects her ability to engage in intercourse and contributes to other interpersonal problems. Education provided to patient on initial HEP on self-stretches, relaxation techniques, breathing exercises, etc. She was also educated on self perineal massage and possible use of dilators when appropriate to encourage expansion and relaxation of soft tissue at insertion and eventually deep vaginal canal. She will benefit from skilled PT for pelvic floor rehab to improve PFM and core strength, improve proper breathing mechanics, improve relaxation of PFM and glut musculature, and improve overall quality of life. Patient would benefit from skilled PT services to address these impairments and to maximize function.  Thank you for the referral.    Understanding of Dx/Px/POC: good     Prognosis:  good    Goals  Impairment Goals 4-6 weeks   In order to maximize function patient will be able to...   - Decrease intensity/duration/frequency of pain to 4/10  - Improve PERF score to WNLs to improve PFM contraction with ADLs  - Increase core and PFM strength to 4/5 throughout to maintain proper stability and support for visceral structures  - Increase hip strength to 4/5 throughout to improve PFM and overall pelvic stability with ADLs  - Demonstrate improved hip flexibility as demonstrated by increased ROM through therapeutic exercise    Functional Goals 6-8 weeks  In order to return to prior level of function patient will be able to...   - Participate in ADL's/IADL's/sport specific activities with no greater than 2/10 pain.   - Demonstrate independence and compliant with HEP  - Demonstrate functional activities with good core/glute/PF muscle strength without compensation/pain/difficulty   - Patient will be able to engage in intercourse without pain.    Plan  Patient would benefit from: skilled physical therapy  Planned modality interventions: cryotherapy, electrical stimulation/Russian stimulation and thermotherapy: hydrocollator packs    Planned therapy interventions: balance/weight bearing training, body mechanics training, flexibility, functional ROM exercises, transfer training, home exercise program, therapeutic activities, therapeutic exercise, stretching, strengthening, massage, Avendano taping, neuromuscular re-education, patient education, manual therapy, postural training, joint mobilization, IASTM, kinesiology taping and nerve gliding    Frequency: 1-2x week  Duration in weeks: 4  Treatment plan discussed with: patient, PTA and referring physician        [facility] POSTPARTUM SUBJECTIVE EVAL:   History Of Present Illness:  Po Nieves is a 29 y.o. year-old female who presents post-partum Vaginal delivery, Baby was born at 39 weeks gestation and weighed 7lb 10.6oz. Patient reports  "end of January/beginning of February attempted to have intercourse for the first time since post-partum, however had severe pain at insertion. She reports she felt pain and sensation of \"tearing\" at insertion . She went to OBGYN to make sure stitches were not tampered, however was intact and patient was cleared for PT evaluation at this time. Patient reports she has not attempted intercourse since then due to fear of pain.    Labor position includes the following:  Supine lithotomy  Perineal tearing rdgrdrrdarddrderd:rd rd3rd Baby feeding:  Breast only  Patient plans to return to work: Yes    Planned RTW date:  3/10/2025  Other children: No     Works from home  Exercise/Fitness current activity and goals:  Walking, yoga, strength training (does something every day)  Birth control:  None   no spotting or bleading reported  Sexual activity:  Painful intercourse   at insertion, deep penetration (lubricant did not help); soreness for about 1 week after  Daily urination/voiding frequency (times per day):  5-8  Daily hydration:  >100 ounces   no urinary leakage  Bowel frequency/regularity:  Daily  Treatments:     Current treatment: physical therapy      Pain location:  Incisional and perineal  Patient Goals:     Patient goals for therapy:  Decreased pain, improved comfort and improved pain management      Objective     Strength/Myotome Testing     Left Hip   Planes of Motion   Flexion: 4-  Extension: 3  Abduction: 3+  Adduction: 3+  External rotation: 3+  Internal rotation: 3+    Right Hip   Planes of Motion   Flexion: 4-  Extension: 3  Abduction: 3+  Adduction: 3+  External rotation: 3+  Internal rotation: 3+      Abdominal Assessment:      Position: supine exam  Abdominal Assessment: Internal assessment performed however unable to penetrate deeper past point of insertion due to significant pain and soft tissue restriction    Skin inspection:   no scars present.       General Perineum Exam:   perineum intact.     Visual Inspection of " Perineum:   Excursion of perineal body in cephalad direction with contraction of pelvic floor muscles (PFM): weak  Excursion of perineal body in caudal direction with relaxation of pelvic floor muscles (PFM): unable  Involuntary contraction with coughing: no  Involuntary relaxation with bearing down: no  Cough reflex: cough reflex  Sensation: intact    Pelvic Organ Prolapse   no pelvic organ prolapse  Perineal body inspection: within normal limits        Pelvic Floor Muscle Exam:     Muscle Contraction: well isolated   Breathing pattern with contraction: within normal limits   Pelvic floor muscle relaxation is incomplete.   10% pelvic floor relaxation   4 seconds required for complete relaxation.        PERFECT Score        Perfect Score: Unable to assess PERFECT due to inability to penetrate past insertion due to pain and soft tissue restrictions; at 1st layer able to hold PFM contraction about 3/5 strength for 2-3 seconds      pelvic floor exam consent given by patient    Pelvic exam completed: vaginally                 POC Expires Auth Status Start Date Expiration Date PT Visit Limit    3/24/2025 No Auth Req   BOMN   Date 2/24/2025       Used 1       Remaining           Diagnosis: dyspareunia, vaginismus    Precautions: post-partum vaginally (12/10/2024)   Next Physician's Appt:   [employer] HEP:   Manuals 2/24       STM DK, perineal       PROM        PERF Pain at insertion               Neuro Re-Ed        Biofeedback        PFM Contract        Quick Flicks        TA ball press        Glute Set        TA + PFM w/ hip ADD ball squeeze        TA + PFM w/ BKFO        TA + PFM w/ H/L marches                                Ther Ex        SKTC        DKTC w/ ball        LTRs        Butterfly ADD Stretch        GINETTE/ piriformis stretch        Happy Baby        Standing adductor stretch        Child's Pose        SB pelvic floor circles                                 Ther Activity              Bike for LE mobility              PFM w/ STS        TA + PFM w/ lifting/carrying tasks                Diaphragmatic Breathing        Pt Ed HEP, POC       Re-Evaluation             Modalities             Heat/ice (PRN)